# Patient Record
Sex: MALE | Race: WHITE | NOT HISPANIC OR LATINO | Employment: FULL TIME | URBAN - METROPOLITAN AREA
[De-identification: names, ages, dates, MRNs, and addresses within clinical notes are randomized per-mention and may not be internally consistent; named-entity substitution may affect disease eponyms.]

---

## 2023-10-02 ENCOUNTER — TREATMENT (OUTPATIENT)
Dept: PHYSICAL THERAPY | Facility: CLINIC | Age: 38
End: 2023-10-02
Payer: COMMERCIAL

## 2023-10-02 DIAGNOSIS — M54.50 BILATERAL LOW BACK PAIN: Primary | ICD-10-CM

## 2023-10-02 PROCEDURE — 97110 THERAPEUTIC EXERCISES: CPT | Mod: GP | Performed by: INTERNAL MEDICINE

## 2023-10-02 ASSESSMENT — PAIN - FUNCTIONAL ASSESSMENT: PAIN_FUNCTIONAL_ASSESSMENT: 0-10

## 2023-10-02 ASSESSMENT — PAIN SCALES - GENERAL: PAINLEVEL_OUTOF10: 4

## 2023-10-02 NOTE — PROGRESS NOTES
"Physical Therapy    Physical Therapy Treatment    Patient Name: Zach Cuellar  MRN: [id number]  Today's Date: 10/2/2023  Time Calculation  Start Time: 0801  Stop Time: 0839  Time Calculation (min): 38 min    Insurance reviewed   Visit number: 3/MN   Authorization not required after evaluation        Assessment:  PT Assessment  Evaluation/Treatment Tolerance: Patient tolerated treatment well  Focus of session on targeted R hip flexor stretching and progressing TA/core strengthening in sitting and standing. Cont to have positive response to pelvic MET. Also has resolution of R hip sx's/pain with hip flexor stretching off edge of mat; administered for HEP. Tolerates progression of seated and then standing TA/core strengthening without increase in sx's; also administered for HEP. Will monitor response to update NV. Reports no pain at end of session.    Plan:  Continue with current POC  Monitor HEP  Progress TA/core strengthening in sitting and standing as addie.    Current Problem  1. Bilateral low back pain            Subjective   HEP: 1-2x/day  Pain #: 4/10  Location: PSIS and R hip flexor/ASIS  Functional: Bought new supportive tennis shoes Wednesday night, got them Thursday and wore them through Saturday with </= 1/10. Got a deep tissue massage Friday night with relief. Played tennis Saturday with increase in R PSIS and R hip flexor pain.       Precautions  Precautions  Precautions Comment: None  Vital Signs     Pain  Pain Assessment: 0-10  Pain Score: 4  Pain Type: Acute pain  Pain Location: Hip (Back)  Pain Orientation: Right    Treatments:  Therapeutic Exercise: 38 mins   Access Code: [email or web address]  - SciFit lvl 4 x5 mins   - Pelvic MET with R hip isometric flexion and L hip isometric extension 10x10\" holds  - RLE modified gavin test stretch off edge of mat 3x30\" holds  - Seated TA bracing 10x5\" holds  - Seated TA hip abduction with GTB 2x10  - Seated TA march with GTB 5x; modified to without resistance due to increased R " "PSIS pain  - Standing rows with YTB  - Standing shld extension with YTB    NC:  - LTRs 10x5-10\" hold  - Supine TA 10x10\" holds  - Supine TA march 10x  - Supine TA hip abduction 10x  - Supine Hamstring Stretch with Strap - 2 x daily - 7 x weekly - 3-5 sets - 20-30 second hold  - Standing Hamstring Stretch with Step - 2 x daily - 7 x weekly - 3-5 sets - 20-30 second hold  - Supine Lower Trunk Rotation - 2 x daily - 7 x weekly - 2 sets - 10 reps - 10 second hold  - Prone Hip Extension on Table - 2 x daily - 7 x weekly - 2 sets - 10 reps.     KEY  NC = not completed this visit   ! = pain  ~ = approximation      OP EDUCATION:  Education  Individual(s) Educated: Patient  Education Provided: Anatomy, Home Exercise Program, Other (Developing B foot arch by walking around house without supportive shoes or slippers)  Patient Response to Education: Patient/Caregiver Verbalized Understanding of Information       "

## 2023-10-03 NOTE — PROGRESS NOTES
"Physical Therapy    Physical Therapy Treatment    Patient Name: Zach Cuellar  MRN: [id number]  Today's Date:  10/4/23  Time Calculation  Start Time: 0750  Stop Time: 0830  Time Calculation (min): 40 min  Insurance reviewed   Visit number: 4/MN   Authorization not required after evaluation       Assessment: Pt reported reduced pain, and feeling \"more level\" following manual therapy to address pelvic asymmetries noted.  R le presenting as longer leg.  Feel pt may be overutilizing R QL to clear R LE from ground while walking.  Pelvis symmetrical post MET, however, do feel pt has true Leg length discrepancy, R LE 1/2\" longer (1.3cm), recommending trial of heel lift in L shoe. Pt in agreement, as he has used in the past. Able to progress core ex in supine due to no longer challenging w/o incident.        Plan: monitor sxs to manual therapy, L heel lift. Progress core strength as able.   Suggestions: TB resisted sidestepping, zig zag walking;  paloff presses, fwd/side planks         Current Problem  1. Chronic bilateral low back pain with right-sided sciatica            Subjective   Pt reports relief w/ current tx, no pain w/ tennis playing x 2 hrs or walking.  Sitting at work seems to provoke pain the most.  Pt reports pain is anterior portion of R iliac crest.  LBP much improved since purchasing [employer] shoes.     Precautions: NONE  HeP: pt is compliant     Pain  Pain Score: 3  Pain Type: Acute pain  Pain Location: Hip  Pain Orientation: Right    Objective   Treatments:    Therapeutic Exercise: 8 mins, 1 unit   Access Code: [email or web address]  - SciFit lvl 4 x5 mins : subjective  - Pelvic MET with R hip isometric flexion and L hip isometric extension 10x10\" holds: PT TO PERFORM PRN ONLY  -bridge: CHANGED TO UNI ON 10/4 DUE TO NON CHALLENGING  - Seated TA march : CHANGED TO DEAD BUG AND SLR ON 10/4 DUE TO MARCH TOO EASY  Suggestions: TB resisted sidestepping, zig zag walking;  paloff presses, fwd/side planks    NC:  - LTRs 10x5-10\" " "hold  - Seated TA hip abduction with GTB 2x10  - Standing rows with YTB  - Standing shld extension with YTB   - RLE modified gavin test stretch off edge of mat 3x30\" holds  - Supine Hamstring Stretch with Strap - 2 x daily - 7 x weekly - 3-5 sets - 20-30 second hold  - Standing Hamstring Stretch with Step - 2 x daily - 7 x weekly - 3-5 sets - 20-30 second hold  - Supine Lower Trunk Rotation - 2 x daily - 7 x weekly - 2 sets - 10 reps - 10 second hold  - Prone Hip Extension on Table - 2 x daily - 7 x weekly - 2 sets - 10 reps.     MANUAL THERAPY 32 MIN, 2 UNITS  MET to correct R depressed pubis, R depressed IT.  Sacrum level.  STM post>> ant R iliac crest, R QL      OP EDUCATION: rationale for tx interventions.  HEP update.  Recommend 1/2\" heel lift L shoe.             "

## 2023-10-04 ENCOUNTER — TREATMENT (OUTPATIENT)
Dept: PHYSICAL THERAPY | Facility: CLINIC | Age: 38
End: 2023-10-04
Payer: COMMERCIAL

## 2023-10-04 DIAGNOSIS — G89.29 CHRONIC BILATERAL LOW BACK PAIN WITH RIGHT-SIDED SCIATICA: Primary | ICD-10-CM

## 2023-10-04 DIAGNOSIS — M54.41 CHRONIC BILATERAL LOW BACK PAIN WITH RIGHT-SIDED SCIATICA: Primary | ICD-10-CM

## 2023-10-04 PROCEDURE — 97140 MANUAL THERAPY 1/> REGIONS: CPT | Mod: GP,CQ

## 2023-10-04 PROCEDURE — 97110 THERAPEUTIC EXERCISES: CPT | Mod: GP,CQ

## 2023-10-04 ASSESSMENT — PAIN SCALES - GENERAL: PAINLEVEL_OUTOF10: 3

## 2023-10-08 PROBLEM — L73.9 FOLLICULAR DISORDER, UNSPECIFIED: Status: ACTIVE | Noted: 2022-12-01

## 2023-10-08 RX ORDER — MELOXICAM 15 MG/1
1 TABLET ORAL DAILY
COMMUNITY
Start: 2022-04-06

## 2023-10-08 RX ORDER — METHYLPREDNISOLONE 4 MG/1
TABLET ORAL
COMMUNITY
Start: 2023-07-06

## 2023-10-08 RX ORDER — NAPROXEN 500 MG/1
500 TABLET ORAL EVERY 12 HOURS PRN
COMMUNITY
Start: 2023-07-06

## 2023-10-08 RX ORDER — CLINDAMYCIN PHOSPHATE 11.9 MG/ML
1 SOLUTION TOPICAL
COMMUNITY
Start: 2018-01-05

## 2023-10-08 RX ORDER — LORATADINE 10 MG/1
10 TABLET ORAL
COMMUNITY

## 2023-10-09 ENCOUNTER — TREATMENT (OUTPATIENT)
Dept: PHYSICAL THERAPY | Facility: CLINIC | Age: 38
End: 2023-10-09
Payer: COMMERCIAL

## 2023-10-09 DIAGNOSIS — M54.41 CHRONIC BILATERAL LOW BACK PAIN WITH RIGHT-SIDED SCIATICA: Primary | ICD-10-CM

## 2023-10-09 DIAGNOSIS — G89.29 CHRONIC BILATERAL LOW BACK PAIN WITH RIGHT-SIDED SCIATICA: Primary | ICD-10-CM

## 2023-10-09 PROCEDURE — 97110 THERAPEUTIC EXERCISES: CPT | Mod: GP | Performed by: INTERNAL MEDICINE

## 2023-10-09 ASSESSMENT — PAIN SCALES - GENERAL: PAINLEVEL_OUTOF10: 0 - NO PAIN

## 2023-10-09 ASSESSMENT — PAIN - FUNCTIONAL ASSESSMENT: PAIN_FUNCTIONAL_ASSESSMENT: 0-10

## 2023-10-09 NOTE — PROGRESS NOTES
"Physical Therapy    Physical Therapy Treatment    Patient Name: Zach Cuellar  MRN: [id number]  Today's Date:  10/9/23  Time Calculation  Start Time: 0801  Stop Time: 0842  Time Calculation (min): 41 min    Insurance reviewed   Visit number: 5/MN   Authorization not required after evaluation       Assessment: Focus of session on progression of core and proximal hip strengthening. In to session with no back pain. Pt reports increased B hip flexor tension/activation but no pain reported throughout session. Appropriately challenged with plank progression with rest breaks taken to manage mm fatigue. Pt reports some soreness in L QL area at end of session after L anti-rotation exercise but no pain. Pt verbalizes understanding to updated HEP. Recommend decreasing PT frequency to 1x/week if pt returns next session with no pain.    PT Assessment  Evaluation/Treatment Tolerance: Patient tolerated treatment well    Plan: assess response to HEP update. Decrease PT frequency to 1x/week if pt returns next session with no pain.    Current Problem  1. Chronic bilateral low back pain with right-sided sciatica            Subjective   Endorses no pain at rest. Played tennis last night without increase in pain. Also able to lean fwd to grab his dog's food bowl without back pain. Tried 1/2\" heel lift in L shoe all day last Thursday but started to develop increased pain in R hip and so discontinued. Noticed increased B foot soreness Saturday; was on his feet more.     Precautions: NONE  HEP: pt is compliant    Pain  Pain Assessment: 0-10  Pain Score: 0 - No pain    Treatments:  Therapeutic Exercise: 41 mins   Access Code: [email or web address]  - SciFit lvl 6 x5 mins; LE warm-up and subjective  - Unilateral bridge 8x5\" holds each side  - Supine dead bug 10x3\" holds  - Green TB resisted side-stepping around ankles x6 laps  - Green TB resisted zig/zag/monster walks around ankles x6 laps  - FWD knee plank lifts 10x5\" holds  - Modified side plank lifts 8x5\" " "holds each side  - Paloff press with GTB 20x each side    NC:  - Pelvic MET with R hip isometric flexion and L hip isometric extension 10x10\" holds: PT TO PERFORM PRN ONLY: NC  - LTRs 10x5-10\" hold  - Seated TA hip abduction with GTB 2x10  - Standing rows with YTB  - Standing shld extension with YTB   - RLE modified gavin test stretch off edge of mat 3x30\" holds  - Supine Hamstring Stretch with Strap - 2 x daily - 7 x weekly - 3-5 sets - 20-30 second hold  - Standing Hamstring Stretch with Step - 2 x daily - 7 x weekly - 3-5 sets - 20-30 second hold  - Supine Lower Trunk Rotation - 2 x daily - 7 x weekly - 2 sets - 10 reps - 10 second hold  - Prone Hip Extension on Table - 2 x daily - 7 x weekly - 2 sets - 10 reps.     Manual Therapy:    NC:  MET to correct R depressed pubis, R depressed IT.  Sacrum level.  STM post>> ant R iliac crest, R QL    OP EDUCATION: rationale for tx interventions.  HEP update.  Recommend 1/2\" heel lift L shoe.   Education  Individual(s) Educated: Patient  Education Provided: Home Exercise Program (HEP updated)  Patient Response to Education: Patient/Caregiver Verbalized Understanding of Information, Patient/Caregiver Performed Return Demonstration of Exercises/Activities, Patient/Caregiver Asked Appropriate Questions  "

## 2023-10-10 NOTE — PROGRESS NOTES
"Physical Therapy    Physical Therapy Treatment    Patient Name: Zach Cuellar  MRN: [id number]  Today's Date:  10/11/23  Time Calculation  Start Time: 0730  Stop Time: 0813  Time Calculation (min): 43 min    Insurance reviewed   Visit number: 6/MN   Authorization not required after evaluation       Assessment: Focus of session on improving soft tissue mobility R QL: in supine, elevated R ASIS and iliac crest despite having true Leg length discrepancy w/ R LE longer. Due to c/c of pain in sit, ? How much leg length discrepancy affecting pain.  HEP updated w/ stretching ex.  Pt exited clinic w/ significantly less pain and easier to perform bridge.  Witheld core ex today due to time constraints and DOMS reported in subjective.         Plan: assess response to manual therapy/FDM.   Decrease PT frequency to 1x/week if pt returns next session with no pain.    Current Problem  1. Chronic bilateral low back pain with right-sided sciatica            Subjective   0/10 pain R ASIS but 3/10 pain w/ sitting/driving.  Pt enters clinic very sore post new ex last PT session and playing tennis last night.     Precautions: NONE  HEP: pt is compliant    Pain  Pain Assessment: 0-10  Pain Score: 0 - No pain  Pain Location: Hip  Pain Orientation: Right, Anterior    Treatments:  Therapeutic Exercise: 5 min , 0 units  VERBALLY ADDED TO HEP 10/11,Pt declined handouts:  -reviewed 3 way prayer stretching (20-30\"hold, 2-3x per position)  -hip flexor/quad stretching in stand, with and w/o trunk lean to opposite side     NP:  Access Code: [email or web address]  - SciFit lvl 6 x5 mins; LE warm-up and subjective  - Unilateral bridge 8x5\" holds each side  - Supine dead bug 10x3\" holds  - Green TB resisted side-stepping around ankles x6 laps  - Green TB resisted zig/zag/monster walks around ankles x6 laps  - FWD knee plank lifts 10x5\" holds  - Modified side plank lifts 8x5\" holds each side  - Paloff press with GTB 20x each side    NC:  - Pelvic MET with R hip " "isometric flexion and L hip isometric extension 10x10\" holds: PT TO PERFORM PRN ONLY: NC  - LTRs 10x5-10\" hold  - Seated TA hip abduction with GTB 2x10  - Standing rows with YTB  - Standing shld extension with YTB   - RLE modified gavin test stretch off edge of mat 3x30\" holds  - Supine Hamstring Stretch with Strap - 2 x daily - 7 x weekly - 3-5 sets - 20-30 second hold  - Standing Hamstring Stretch with Step - 2 x daily - 7 x weekly - 3-5 sets - 20-30 second hold  - Supine Lower Trunk Rotation - 2 x daily - 7 x weekly - 2 sets - 10 reps - 10 second hold  - Prone Hip Extension on Table - 2 x daily - 7 x weekly - 2 sets - 10 reps.     Manual Therapy:  38 min, 3 units  DTM, cupping w/ external glide, and FDM to several TrP  R QL and along R iliac crest     OP EDUCATION: rationale for tx interventions.  HEP updated verbally     "

## 2023-10-11 ENCOUNTER — TREATMENT (OUTPATIENT)
Dept: PHYSICAL THERAPY | Facility: CLINIC | Age: 38
End: 2023-10-11
Payer: COMMERCIAL

## 2023-10-11 DIAGNOSIS — G89.29 CHRONIC BILATERAL LOW BACK PAIN WITH RIGHT-SIDED SCIATICA: Primary | ICD-10-CM

## 2023-10-11 DIAGNOSIS — M54.41 CHRONIC BILATERAL LOW BACK PAIN WITH RIGHT-SIDED SCIATICA: Primary | ICD-10-CM

## 2023-10-11 PROCEDURE — 97140 MANUAL THERAPY 1/> REGIONS: CPT | Mod: GP,CQ

## 2023-10-11 ASSESSMENT — PAIN SCALES - GENERAL: PAINLEVEL_OUTOF10: 0 - NO PAIN

## 2023-10-11 ASSESSMENT — PAIN - FUNCTIONAL ASSESSMENT: PAIN_FUNCTIONAL_ASSESSMENT: 0-10

## 2023-10-12 NOTE — PROGRESS NOTES
"Physical Therapy    Physical Therapy Treatment    Patient Name: Zach Cuellar  MRN: [id number]  Today's Date:  10/11/23       Insurance reviewed   Visit number: 7/MN   Authorization not required after evaluation       Assessment: Focus of session on improving soft tissue mobility R QL: in supine, elevated R ASIS and iliac crest despite having true Leg length discrepancy w/ R LE longer. Due to c/c of pain in sit, ? How much leg length discrepancy affecting pain.  HEP updated w/ stretching ex.  Pt exited clinic w/ significantly less pain and easier to perform bridge.  Witheld core ex today due to time constraints and DOMS reported in subjective.         Plan: assess response to manual therapy/FDM.   Decrease PT frequency to 1x/week if pt returns next session with no pain.    Current Problem  1. Chronic bilateral low back pain with right-sided sciatica            Subjective   0/10 pain R ASIS but 3/10 pain w/ sitting/driving.  Pt enters clinic very sore post new ex last PT session and playing tennis last night.     Precautions: NONE  HEP: pt is compliant    Pain       Treatments:  Therapeutic Exercise: 5 min , 0 units  VERBALLY ADDED TO HEP 10/11,Pt declined handouts:  -reviewed 3 way prayer stretching (20-30\"hold, 2-3x per position)  -hip flexor/quad stretching in stand, with and w/o trunk lean to opposite side     NP:  Access Code: [email or web address]  - SciFit lvl 6 x5 mins; LE warm-up and subjective  - Unilateral bridge 8x5\" holds each side  - Supine dead bug 10x3\" holds  - Green TB resisted side-stepping around ankles x6 laps  - Green TB resisted zig/zag/monster walks around ankles x6 laps  - FWD knee plank lifts 10x5\" holds  - Modified side plank lifts 8x5\" holds each side  - Paloff press with GTB 20x each side    NC:  - Pelvic MET with R hip isometric flexion and L hip isometric extension 10x10\" holds: PT TO PERFORM PRN ONLY: NC  - LTRs 10x5-10\" hold  - Seated TA hip abduction with GTB 2x10  - Standing rows with YTB  - " "Standing shld extension with YTB   - RLE modified gavin test stretch off edge of mat 3x30\" holds  - Supine Hamstring Stretch with Strap - 2 x daily - 7 x weekly - 3-5 sets - 20-30 second hold  - Standing Hamstring Stretch with Step - 2 x daily - 7 x weekly - 3-5 sets - 20-30 second hold  - Supine Lower Trunk Rotation - 2 x daily - 7 x weekly - 2 sets - 10 reps - 10 second hold  - Prone Hip Extension on Table - 2 x daily - 7 x weekly - 2 sets - 10 reps.     Manual Therapy:  38 min, 3 units  DTM, cupping w/ external glide, and FDM to several TrP  R QL and along R iliac crest     OP EDUCATION: rationale for tx interventions.  HEP updated verbally     "

## 2023-10-16 ENCOUNTER — TREATMENT (OUTPATIENT)
Dept: PHYSICAL THERAPY | Facility: CLINIC | Age: 38
End: 2023-10-16
Payer: COMMERCIAL

## 2023-10-16 ENCOUNTER — DOCUMENTATION (OUTPATIENT)
Dept: PHYSICAL THERAPY | Facility: CLINIC | Age: 38
End: 2023-10-16
Payer: COMMERCIAL

## 2023-10-16 DIAGNOSIS — G89.29 CHRONIC BILATERAL LOW BACK PAIN WITH RIGHT-SIDED SCIATICA: Primary | ICD-10-CM

## 2023-10-16 DIAGNOSIS — M54.41 CHRONIC BILATERAL LOW BACK PAIN WITH RIGHT-SIDED SCIATICA: Primary | ICD-10-CM

## 2023-10-16 NOTE — PROGRESS NOTES
Physical Therapy                 Therapy Communication Note    Patient Name: Zach Cuellar  MRN: [id number]  Today's Date: 10/16/2023     Discipline: Physical Therapy    Missed Visit Reason:      Missed Time: Cancel    Comment:

## 2023-10-17 NOTE — PROGRESS NOTES
"Physical Therapy    Physical Therapy Treatment    Patient Name: Zach Cuellar  MRN: [id number]  Today's Date:  10/11/23  Time Calculation  Start Time: 0752  Stop Time: 0830  Time Calculation (min): 38 min    Insurance reviewed   Visit number: 7/MN   Authorization not required after evaluation       Assessment: + response to DTM and FDM to address several TrP R QL and iliac crest regions.  Significant improvement in soft tissue mobility noted post tx sessions, several TrP abolished. Pt pain free exiting clinic.  Applied trial of KT tape to inhibit region and instructed pt to continue w/ lucas pose stretching to improve carryover of pain relief from visit to visit.   Per discussion w/ Nuris Ram, PT, pt is not a DN candidate due to location of pain and clinician comfort level.     Plan: continue w/ DTM, FDM.  Decrease PT frequency to 1x/week if pt returns next session with no pain.    Current Problem  1. Chronic bilateral low back pain with right-sided sciatica            Subjective   Pt very pleased re: amount of pain relief experienced w/ soft tissue mobilization last session: cancelled last scheduled PT session due to he was pain free.  Able to play tennis pain free.  Driving/sitting reproduced pain couple days ago.  Pt requests manual therapy again this date.     Precautions: NONE  HEP: pt is compliant    Pain: see subjective       Treatments:  Therapeutic Exercise: 0 min , 0 units  VERBALLY ADDED TO HEP 10/11,Pt declined handouts:  -reviewed 3 way prayer stretching (20-30\"hold, 2-3x per position)       NP:  -hip flexor/quad stretching in stand, with and w/o trunk lean to opposite side  Access Code: [email or web address]  - SciFit lvl 6 x5 mins; LE warm-up and subjective  - Unilateral bridge 8x5\" holds each side  - Supine dead bug 10x3\" holds  - Green TB resisted side-stepping around ankles x6 laps  - Green TB resisted zig/zag/monster walks around ankles x6 laps  - FWD knee plank lifts 10x5\" holds  - Modified side plank lifts " "8x5\" holds each side  - Paloff press with GTB 20x each side  - Pelvic MET with R hip isometric flexion and L hip isometric extension 10x10\" holds: PT TO PERFORM PRN ONLY: NC  - LTRs 10x5-10\" hold  - Seated TA hip abduction with GTB 2x10  - Standing rows with YTB  - Standing shld extension with YTB   - RLE modified gavin test stretch off edge of mat 3x30\" holds  - Supine Hamstring Stretch with Strap - 2 x daily - 7 x weekly - 3-5 sets - 20-30 second hold  - Standing Hamstring Stretch with Step - 2 x daily - 7 x weekly - 3-5 sets - 20-30 second hold  - Supine Lower Trunk Rotation - 2 x daily - 7 x weekly - 2 sets - 10 reps - 10 second hold  - Prone Hip Extension on Table - 2 x daily - 7 x weekly - 2 sets - 10 reps.     Manual Therapy:  38 min, 3 units  DTM, IASTM (scanning, fanning)  and FDM to several TrP  R QL and along R iliac crest     OP EDUCATION: rationale for tx interventions.  HEP review.     "

## 2023-10-18 ENCOUNTER — TREATMENT (OUTPATIENT)
Dept: PHYSICAL THERAPY | Facility: CLINIC | Age: 38
End: 2023-10-18
Payer: COMMERCIAL

## 2023-10-18 DIAGNOSIS — M54.41 CHRONIC BILATERAL LOW BACK PAIN WITH RIGHT-SIDED SCIATICA: Primary | ICD-10-CM

## 2023-10-18 DIAGNOSIS — G89.29 CHRONIC BILATERAL LOW BACK PAIN WITH RIGHT-SIDED SCIATICA: Primary | ICD-10-CM

## 2023-10-18 PROCEDURE — 97140 MANUAL THERAPY 1/> REGIONS: CPT | Mod: GP,CQ

## 2023-10-23 ENCOUNTER — APPOINTMENT (OUTPATIENT)
Dept: PHYSICAL THERAPY | Facility: CLINIC | Age: 38
End: 2023-10-23
Payer: COMMERCIAL

## 2023-10-24 NOTE — PROGRESS NOTES
Physical Therapy    Physical Therapy Treatment    Patient Name: Zach Cuellar  MRN: [id number]  Today's Date:  10/11/23       Insurance reviewed   Visit number: 8/MN   Authorization not required after evaluation       Assessment: + response to DTM and FDM to address several TrP R QL and iliac crest regions.  Significant improvement in soft tissue mobility noted post tx sessions, several TrP abolished. Pt pain free exiting clinic.  Applied trial of KT tape to inhibit region and instructed pt to continue w/ lucas pose stretching to improve carryover of pain relief from visit to visit.   Per discussion w/ Nuris Ram, PT, pt is not a DN candidate due to location of pain and clinician comfort level.     Plan: continue w/ DTM, FDM.  Decrease PT frequency to 1x/week if pt returns next session with no pain.    Current Problem  1. Chronic bilateral low back pain with right-sided sciatica              Subjective   Pt very pleased re: amount of pain relief experienced w/ soft tissue mobilization last session: cancelled last scheduled PT session due to he was pain free.  Able to play tennis pain free.  Driving/sitting reproduced pain couple days ago.  Pt requests manual therapy again this date.     Precautions: NONE  HEP: pt is compliant    Pain: see subjective       Objective:  MODQ: 19% -->    Observation: L knee scars; pt reports having growth plate scraped on L knee when he was 15 years old. L knee varus noted in standing. Pt reports no pain or sx's with prone elbow prop during palpation -->  Gait: WFL -->  Posture: fair, forward head, rounded shoulders in sitting; improved in standing. L knee varus noted in standing -->  Correction of posture: sx's better -->    Dermatomes/Sensation Testing:  (L2) Anterior Thigh: intact -->  (L3) Medial Knee: intact -->  (L4) Medial Malleolus: intact -->  (L5) Dorsal Second Toe Web Space: intact -->  (S1) Lateral Malleolus: intact -->    Myotomes/Strength Testing (MMT in sitting):  (L2)  "Hip Flex (R/L): 4+/4+ -->  (L3) Knee Ext (R/L): 5/5 -->  Knee Flex (R/L): 5/5 -->  (L4) Ankle DF (R/L): 5/5 -->  (L5) GTE (R/L): 5/5 -->  (S1) Ankle PF (R/L): 5/5 -->    Hip Abd (R/L): 4+/4+ -->  Hip Ext (R/L): 4- / 4 -->    HS Flexibility (R/L): ~ -40 / ~ -40 -->    Heel walking (L4): strong -->  Toe walking (S1): strong -->    Special Tests:  Slump (R/L): neg / neg  -->  Active SLR (R/L): neg / neg  -->    Lumbar ROM/Pretest Symptoms Standing:  FIS: mild to moderate limitation with reach to mid shins  -->  RFIS: 10x; sx's worse (R PSIS and piriformis) -->  EIS: no limitation; sx's same -->  ANDREW: 10x; sx's worse -->    Trunk SB (R/L): no limitation p! / no limitation p!  -->  Trunk Rotation (R/L): no limitation / no limitation p! -->    Pretest Symptoms Lying:  ZI: SKTC; ne on sx's -->  RFIL: 10x SKTC; ne on sx's -->  Prone lying: NT -->  Prone on elbows: NT-->  EIL (PPU): NT -->  REIL: NT-->    Palpation: no tenderness currently. Reports he will get tenderness in R PSIS and piriformis area -->      KEY  NT = not tested this visit  p! = pain  ~ = approximation.     Treatments:  Therapeutic Exercise: 0 min , 0 units  VERBALLY ADDED TO HEP 10/11,Pt declined handouts:  -reviewed 3 way prayer stretching (20-30\"hold, 2-3x per position)     NP:  -hip flexor/quad stretching in stand, with and w/o trunk lean to opposite side  Access Code: [email or web address]  - SciFit lvl 6 x5 mins; LE warm-up and subjective  - Unilateral bridge 8x5\" holds each side  - Supine dead bug 10x3\" holds  - Green TB resisted side-stepping around ankles x6 laps  - Green TB resisted zig/zag/monster walks around ankles x6 laps  - FWD knee plank lifts 10x5\" holds  - Modified side plank lifts 8x5\" holds each side  - Paloff press with GTB 20x each side  - Pelvic MET with R hip isometric flexion and L hip isometric extension 10x10\" holds: PT TO PERFORM PRN ONLY: NC  - LTRs 10x5-10\" hold  - Seated TA hip abduction with GTB 2x10  - Standing rows with YTB  - " "Standing shld extension with YTB   - RLE modified gavin test stretch off edge of mat 3x30\" holds  - Supine Hamstring Stretch with Strap - 2 x daily - 7 x weekly - 3-5 sets - 20-30 second hold  - Standing Hamstring Stretch with Step - 2 x daily - 7 x weekly - 3-5 sets - 20-30 second hold  - Supine Lower Trunk Rotation - 2 x daily - 7 x weekly - 2 sets - 10 reps - 10 second hold  - Prone Hip Extension on Table - 2 x daily - 7 x weekly - 2 sets - 10 reps.     Manual Therapy:  38 min, 3 units  DTM, IASTM (scanning, fanning)  and FDM to several TrP  R QL and along R iliac crest     OP EDUCATION: rationale for tx interventions.  HEP review.       Goals:  ST weeks  1. Patient independent with home exercise program to progress current functional status    LTGs: 4-6 weeks  1. Improve Oswestry score to 0-9% impairment to indicate a significant improvement in overall lumbar perception of disability.  2. Decrease complaints of activity pain by 80% at minimum of evaluation rating, 4 of 7 days a week at minimum.  3. Patient will demonstrate improvements in sitting and standing posture without cueing from therapist during 45 minute session to allow for improved tolerances for spinal loading.  4. Patient will demonstrate appropriate and safe body mechanics with work related and/or clinical activities to manage pain and prevent further injury  5. Increase hip extensor strength by 0.5 to 1 manual testing grade to improve overall functional mobility for IADLS and postural control.   6. Patient will be able to play full game of tennis or pickleball with </= 2/10 LBP to improve tolerance to recreational activities and return to PLOF   "

## 2023-10-25 ENCOUNTER — TREATMENT (OUTPATIENT)
Dept: PHYSICAL THERAPY | Facility: CLINIC | Age: 38
End: 2023-10-25
Payer: COMMERCIAL

## 2023-10-25 DIAGNOSIS — G89.29 CHRONIC BILATERAL LOW BACK PAIN WITH RIGHT-SIDED SCIATICA: Primary | ICD-10-CM

## 2023-10-25 DIAGNOSIS — M54.41 CHRONIC BILATERAL LOW BACK PAIN WITH RIGHT-SIDED SCIATICA: Primary | ICD-10-CM

## 2023-10-31 ENCOUNTER — ANCILLARY PROCEDURE (OUTPATIENT)
Dept: RADIOLOGY | Facility: CLINIC | Age: 38
End: 2023-10-31
Payer: COMMERCIAL

## 2023-10-31 ENCOUNTER — OFFICE VISIT (OUTPATIENT)
Dept: ORTHOPEDIC SURGERY | Facility: CLINIC | Age: 38
End: 2023-10-31
Payer: COMMERCIAL

## 2023-10-31 VITALS — WEIGHT: 265 LBS | BODY MASS INDEX: 35.89 KG/M2 | HEIGHT: 72 IN

## 2023-10-31 DIAGNOSIS — M25.551 RIGHT HIP PAIN: Primary | ICD-10-CM

## 2023-10-31 DIAGNOSIS — M25.551 RIGHT HIP PAIN: ICD-10-CM

## 2023-10-31 PROCEDURE — 73502 X-RAY EXAM HIP UNI 2-3 VIEWS: CPT | Mod: RT,FY

## 2023-10-31 PROCEDURE — 73502 X-RAY EXAM HIP UNI 2-3 VIEWS: CPT | Mod: RIGHT SIDE | Performed by: STUDENT IN AN ORGANIZED HEALTH CARE EDUCATION/TRAINING PROGRAM

## 2023-10-31 PROCEDURE — 99214 OFFICE O/P EST MOD 30 MIN: CPT

## 2023-10-31 NOTE — PROGRESS NOTES
Subjective    Patient ID: Zach Cuellar is a 37 y.o. male.    Chief Complaint: Pain of the Right Hip    HPI  This is a pleasant 37-year-old male following up in the office for evaluation of right superior hip and pelvis pain, which has been ongoing for the last few weeks.  No known injury.  I had seen patient in my office in July 2023 for evaluation of lower back and right hip pain.  Patient was more symptomatic with his back, therefore I ordered him a Medrol Dosepak and naproxen, and referred him to physical therapy.  He states that Medrol Dosepak and naproxen were of no relief, but he did perform physical therapy for his low back and hip for 6 to 8 weeks, which did help his low back pain.  States that physical therapist also did deep massage and use Kinesiotape.  He also has a friend who is a pain management doctor, who suggested a right shoe lift.  Patient states that right shoe lift aggravated right hip pain, therefore he discontinued.  He has also switched to more supportive tennis shoes, Eckert.  Unfortunately the right hip pain continued.  He points to right superior hip when describing the pain.  Pain is described as a daily dull ache.  Pain is worse when he is in a sitting position or driving in the car.  It is difficult for him to lay on his right side due to the pain.  He denies right groin thigh or buttock pain.    The patient's past medical, surgical, family, and social history as well as allergies and medications were reviewed and updated in the chart.    Objective   Ortho Exam  Pleasant and no acute distress. Walks with a mildly antalgic gait. Normal right hip appearance without overlying skin changes, ecchymosis, swelling.  He is tender upon palpation of superior iliac crest and hip.  Patient can arise off the chair and ambulate in the office today weightbearing as tolerated without a limp or use of an assistive device. Satisfaction motion of the hip without groin pain elicited. The right hip has  flexion to 100 degrees, with excellent internal and external rotation.  Negative KAREEM and FADIR. Slight increased discomfort with right straight leg raise. No effusion, instability, pain, or crepitus. Both lower extremeties are well perfused, skin is intact, and muscle tone is adequate. Sensation intact to light touch.    Image Results:  XR lumbar spine 2-3 views  Narrative: Interpreted By:  FEDERICO FRANCO MD  MRN: [id number]  Patient Name: FARHAT HEADLEY     STUDY:  Lumbar spine dated  7/6/2023.     INDICATION:  back pain  M54.50: Low back pain     COMPARISON:  None.     ACCESSION NUMBER(S):  [id number]     ORDERING CLINICIAN:  BHUMIKA DUARTE     TECHNIQUE:  AP, lateral, and L5-S1 radiographs of the lumbar spine.     FINDINGS:  There are  5 lumbar type vertebral bodies.  Vertebral body height and  alignment  are maintained.  No fracture or dislocation is evident.  There is likely at least mild facet degenerative change at L4-5 and  L5-S1. Vascular calcifications are seen over the soft tissues.     Impression: Likely mild lower lumbar spine facet degenerative change as seen on  radiography.    Multiple view x-rays of the right hip obtained today personally reviewed per myself and Dr. Scott.  There is no evidence of acute fracture or dislocation.  The hip joint is well-maintained.    Assessment/Plan   Encounter Diagnoses: Atraumatic right hip pain, failure of conservative treatment    Plan: Discussion with patient about differential diagnoses with review of right hip and previous lumbar spine x-rays.  Patient has failed conservative treatment of a Medrol Dosepak, naproxen, ice and heat therapy, 8 weeks of formal physical therapy from September through October for lower back and right hip, a right shoe lift, deep tissue massage and kinesiotaping.  With the failure of this conservative treatment, an MRI of the right hip should be contacted to assess for internal derangement of the right hip, tendon or muscle damage.  He  will continue to avoid aggravating activities, anti-inflammatories, and home exercise program from recent completion of PT.   Once MRI results are complete, I will contact patient for further treatment options.

## 2023-11-09 ENCOUNTER — ANCILLARY PROCEDURE (OUTPATIENT)
Dept: RADIOLOGY | Facility: CLINIC | Age: 38
End: 2023-11-09
Payer: COMMERCIAL

## 2023-11-09 DIAGNOSIS — M25.551 RIGHT HIP PAIN: ICD-10-CM

## 2023-11-09 PROCEDURE — 73721 MRI JNT OF LWR EXTRE W/O DYE: CPT | Mod: RIGHT SIDE | Performed by: RADIOLOGY

## 2023-11-09 PROCEDURE — 72195 MRI PELVIS W/O DYE: CPT | Mod: RIGHT SIDE | Performed by: RADIOLOGY

## 2023-11-09 PROCEDURE — 73721 MRI JNT OF LWR EXTRE W/O DYE: CPT | Mod: RT

## 2023-11-10 ENCOUNTER — TELEPHONE (OUTPATIENT)
Dept: ORTHOPEDIC SURGERY | Facility: CLINIC | Age: 38
End: 2023-11-10
Payer: COMMERCIAL

## 2023-11-10 NOTE — TELEPHONE ENCOUNTER
Spoke with patient in regards to normal right hip MRI. He will follow up with Dr. [place] Dia - pain management [facility].

## 2023-11-13 ENCOUNTER — APPOINTMENT (OUTPATIENT)
Dept: PHYSICAL THERAPY | Facility: CLINIC | Age: 38
End: 2023-11-13
Payer: COMMERCIAL

## 2024-06-07 ENCOUNTER — OFFICE VISIT (OUTPATIENT)
Dept: ORTHOPEDIC SURGERY | Facility: CLINIC | Age: 39
End: 2024-06-07
Payer: COMMERCIAL

## 2024-06-07 ENCOUNTER — APPOINTMENT (OUTPATIENT)
Dept: ORTHOPEDIC SURGERY | Facility: CLINIC | Age: 39
End: 2024-06-07
Payer: COMMERCIAL

## 2024-06-07 VITALS — HEIGHT: 75 IN | BODY MASS INDEX: 30.84 KG/M2 | WEIGHT: 248 LBS

## 2024-06-07 DIAGNOSIS — M25.532 BILATERAL WRIST PAIN: ICD-10-CM

## 2024-06-07 DIAGNOSIS — G56.03 BILATERAL CARPAL TUNNEL SYNDROME: Primary | ICD-10-CM

## 2024-06-07 DIAGNOSIS — M25.531 BILATERAL WRIST PAIN: ICD-10-CM

## 2024-06-07 PROCEDURE — 99213 OFFICE O/P EST LOW 20 MIN: CPT

## 2024-06-07 PROCEDURE — 1036F TOBACCO NON-USER: CPT

## 2024-06-07 NOTE — PROGRESS NOTES
Subjective    Patient ID: Zach Cuellar is a 38 y.o. male.    Chief Complaint: Pain of the Right Hand (Pain and numbness and tingling right hand and wrist) and Pain of the Left Hand (Pain and numbness and tingling left hand and wrist)     HPI  This is a pleasant 38-year-old male presenting to the office for evaluation of bilateral wrist pain, as well as numbness and paresthesia.  States that the pain is pretty consistent, but numbness and paresthesia is intermittent.  Numbness and paresthesia is not in any specific fingers, feels consistent throughout his whole hand.  Numbness and paresthesia and pain is worse at night and with driving.  He is having difficulty with any twisting motion of the left hand and wrist, including opening a jar, or turning a key.  There has been no known injury.  He works in IT, mostly sedentary work at a desk.  He does like to remain active, playing tennis.    The patient's past medical, surgical, family, and social history as well as allergies and medications were reviewed and updated in the chart.    Objective   Ortho Exam  Patient is in no acute distress.  Exam of left and right upper extremity reveals pain-free motion at elbow.  He is tender upon palpation of ulnar side of each wrist.  No erythema ecchymosis soft tissue swelling.  There is no warmth upon touch.  No evidence of thenar atrophy or intrinsic atrophy.  Positive Tinel's test bilaterally, positive carpal tunnel compression test bilaterally, and a positive Phalen test bilaterally.     Assessment/Plan   Encounter Diagnoses: Bilateral wrist pain, bilateral carpal tunnel syndrome    Plan: Discussion with patient about diagnosis with review of conservative treatment options.  Explained to patient that he should obtain carpal tunnel braces, specifically to wear at night, but can wear during the day as tolerated.  We also discussed carpal tunnel injections of Kenalog/lidocaine into each wrist to help decrease pain inflammation.  We  also discussed getting further testing with an EMG of bilateral upper extremities to assess for median neuropathy.  Patient is aware that eventually surgical intervention could be indicated if conservative treatment options are not of benefit.  After discussion of all treatment options, patient and I have decided to proceed with carpal tunnel braces.  He is aware he can return to the office if he would like to try injections.  I can also order him an EMG if he would like.  He will continue to monitor symptoms and follow-up as symptoms dictate.

## 2024-10-16 ENCOUNTER — HOSPITAL ENCOUNTER (OUTPATIENT)
Dept: RADIOLOGY | Facility: CLINIC | Age: 39
Discharge: HOME | End: 2024-10-16
Payer: COMMERCIAL

## 2024-10-16 ENCOUNTER — OFFICE VISIT (OUTPATIENT)
Dept: ORTHOPEDIC SURGERY | Facility: CLINIC | Age: 39
End: 2024-10-16
Payer: COMMERCIAL

## 2024-10-16 DIAGNOSIS — M25.572 LEFT ANKLE PAIN, UNSPECIFIED CHRONICITY: ICD-10-CM

## 2024-10-16 DIAGNOSIS — M79.672 LEFT FOOT PAIN: ICD-10-CM

## 2024-10-16 DIAGNOSIS — M79.662 PAIN OF LEFT CALF: Primary | ICD-10-CM

## 2024-10-16 DIAGNOSIS — R60.0 LEG EDEMA, LEFT: ICD-10-CM

## 2024-10-16 DIAGNOSIS — M67.88 ACHILLES TENDINOSIS: ICD-10-CM

## 2024-10-16 PROCEDURE — 99214 OFFICE O/P EST MOD 30 MIN: CPT | Performed by: PHYSICIAN ASSISTANT

## 2024-10-16 PROCEDURE — 73630 X-RAY EXAM OF FOOT: CPT | Mod: LEFT SIDE | Performed by: RADIOLOGY

## 2024-10-16 PROCEDURE — 73610 X-RAY EXAM OF ANKLE: CPT | Mod: LEFT SIDE | Performed by: RADIOLOGY

## 2024-10-16 PROCEDURE — 99204 OFFICE O/P NEW MOD 45 MIN: CPT | Performed by: PHYSICIAN ASSISTANT

## 2024-10-16 PROCEDURE — 73630 X-RAY EXAM OF FOOT: CPT | Mod: LT

## 2024-10-16 PROCEDURE — 73610 X-RAY EXAM OF ANKLE: CPT | Mod: LT

## 2024-10-16 NOTE — PROGRESS NOTES
NPV-   History of Present Illness  38 y.o.male left leg pain  1. Pain of left calf  Referral to Vascular Medicine    CANCELED: Lower extremity venous duplex left      2. Left foot pain  XR foot left 3+ views      3. Left ankle pain, unspecified chronicity  XR ankle left 3+ views      4. Achilles tendinosis        5. Leg edema, left  Referral to Vascular Medicine    CANCELED: Lower extremity venous duplex left        Mechanism of injury: none  Date of Injury/Pain: 3 weeks  Location of pain: calf pain  Frequency of Pain: worse with rest, sitting  Associated symptoms? Swelling.  Previous treatment?  None  He has still been playing tennis and has no pain with playing or walking   He denies any hx of DVT, No sob, no family hx of DVT    No past medical history on file.     No Known Allergies     No past surgical history on file.     No family history on file.     Social History     Socioeconomic History    Marital status: Single     Spouse name: Not on file    Number of children: Not on file    Years of education: Not on file    Highest education level: Not on file   Occupational History    Not on file   Tobacco Use    Smoking status: Never    Smokeless tobacco: Never   Substance and Sexual Activity    Alcohol use: Yes     Comment: moderate    Drug use: Never    Sexual activity: Not on file   Other Topics Concern    Not on file   Social History Narrative    Not on file     Social Drivers of Health     Financial Resource Strain: Not on file   Food Insecurity: Not on file   Transportation Needs: Not on file   Physical Activity: Not on file   Stress: Not on file   Social Connections: Not on file   Intimate Partner Violence: Not on file   Housing Stability: Not on file        CURRENT MEDICATIONS:   Current Outpatient Medications   Medication Sig Dispense Refill    clindamycin (Cleocin T) 1 % external solution 1 Application.      loratadine (Claritin) 10 mg tablet Take 1 tablet (10 mg) by mouth once daily.      meloxicam  (Mobic) 15 mg tablet Take 1 tablet (15 mg) by mouth once daily.      methylPREDNISolone (Medrol Dospak) 4 mg tablets TAKE AS DIRECTED PER PACKAGE INSTRUCTIONS      naproxen (Naprosyn) 500 mg tablet Take 1 tablet (500 mg) by mouth every 12 hours if needed.       No current facility-administered medications for this visit.        27 point review of systems negative except what is stated in HPI    Constitutional Exam: patient's height and weight reviewed, well-kempt  Psychiatric Exam: alert and oriented x 3, appropriate mood and behavior  Eye Exam: TONEY BARRAGAN  Pulmonary Exam: breathing non-labored, no apparent distress  Lymphatic exam: no appreciable lymphadenopathy in the lower extremities  Cardiovascular exam: DP pulses 2+ bilaterally, PT 2+ bilaterally, toes are pink with good capillary refill, no pitting edema  Skin exam: no open lesions, rashes, abrasions or ulcerations  Neurological exam: sensation to light touch intact in both lower extremities in peripheral and dermatomal distributions (except for any abnormalities noted in musculoskeletal exam)      On examination of the left ankle/foot:  Normal gait  Normal alignment  Mild swelling of the leg. No ecchymosis or erythema  Normal ROM in plantarflexion, dorsiflexion, inversion and eversion  Normal strength in plantarflexion, dorsiflexion, inversion and eversion.  Tenderness to palpation: calf  No tenderness to palpation over the Achilles, medial malleolus, posterior tibial tendon, peroneal tendon, base of fifth metatarsal, deltoid ligament, talus or navicular.  Neurovascularly intact.  No sensory deficit to light touch.  Dorsalis pedis and posterior tibial pulses 2+ bilaterally.  Negative proprioception testing.   - Granados's test                              - Dorsiflexion-eversion test  - Anterior Drawer test                        - Talar tilt test  - Squeeze test   + Dawson's testing.   IMAGING  I personally reviewed the patient's x-ray images and reports of  the left foot and ankle. The xrays show no fractures or dislocation.  Normal joint spacing         ASSESSMENT: left leg edema, calf pain, Achilles tendinosis     PLAN: Treatment options were discussed with the patient. It was recommended he go to the ER to be worked up for a possible DVT. He understood and will head to the ER today.  The patient was given a prescription for physical therapy.  Physical therapy is medically necessary to improve strength, balance, range of motion and functional outcomes after injury and/or surgery. Patient was given a handout and instructed on an at home stretching program.  They should do these exercises 3 times per day for 6 weeks and then daily. Patient can use OTC aspercream for pain and continue to ice and elevate supported at the calf to reduce swelling. All the patient's questions were answered. The patient agrees with the above plan.  Follow up at ER    Delio Perez PA-C

## 2024-10-16 NOTE — PATIENT INSTRUCTIONS
You were ordered an ultrasound to rule out a blood clot; please go to [facility] to get your testing done today    1. Follow stretching exercises that were on a separate handout   2. Hold each stretch for a least 1 minute  3. Do not bounce while stretching  4. Stretch for 10 minutes at a time, 3x a day for 6 weeks then daily  5. Remember, it takes several weeks to a few months of consistent stretching to increase flexibility and decrease symptoms.     You can use OTC Voltaren gel or aspercream and apply it to the injured area.    Ice and elevate supported at the calf with no pressure on the heel to reduce swelling.    Wear compression stockings to reduce swelling     If you received a plantar fascial night splint, then it should be worn for 6 weeks nightly and as needed after that period of time.  This is to minimize your stiffness and pain with the first few steps in the morning.    Referral to vascular

## 2024-10-17 ENCOUNTER — APPOINTMENT (OUTPATIENT)
Dept: RADIOLOGY | Facility: HOSPITAL | Age: 39
End: 2024-10-17
Payer: COMMERCIAL

## 2024-10-17 ENCOUNTER — APPOINTMENT (OUTPATIENT)
Dept: CARDIOLOGY | Facility: HOSPITAL | Age: 39
End: 2024-10-17
Payer: COMMERCIAL

## 2024-10-17 ENCOUNTER — HOSPITAL ENCOUNTER (EMERGENCY)
Facility: HOSPITAL | Age: 39
Discharge: HOME | End: 2024-10-17
Attending: EMERGENCY MEDICINE
Payer: COMMERCIAL

## 2024-10-17 VITALS
DIASTOLIC BLOOD PRESSURE: 86 MMHG | OXYGEN SATURATION: 99 % | HEART RATE: 95 BPM | WEIGHT: 240 LBS | SYSTOLIC BLOOD PRESSURE: 138 MMHG | HEIGHT: 75 IN | BODY MASS INDEX: 29.84 KG/M2 | RESPIRATION RATE: 18 BRPM | TEMPERATURE: 97.2 F

## 2024-10-17 DIAGNOSIS — M79.605 LEFT LEG PAIN: Primary | ICD-10-CM

## 2024-10-17 DIAGNOSIS — M79.89 OTHER SPECIFIED SOFT TISSUE DISORDERS: ICD-10-CM

## 2024-10-17 PROCEDURE — 93971 EXTREMITY STUDY: CPT | Performed by: SURGERY

## 2024-10-17 PROCEDURE — 93971 EXTREMITY STUDY: CPT

## 2024-10-17 PROCEDURE — 73590 X-RAY EXAM OF LOWER LEG: CPT | Mod: LEFT SIDE | Performed by: RADIOLOGY

## 2024-10-17 PROCEDURE — 99284 EMERGENCY DEPT VISIT MOD MDM: CPT | Mod: 25

## 2024-10-17 PROCEDURE — 99284 EMERGENCY DEPT VISIT MOD MDM: CPT | Performed by: EMERGENCY MEDICINE

## 2024-10-17 PROCEDURE — 73590 X-RAY EXAM OF LOWER LEG: CPT | Mod: LT

## 2024-10-17 ASSESSMENT — LIFESTYLE VARIABLES
EVER HAD A DRINK FIRST THING IN THE MORNING TO STEADY YOUR NERVES TO GET RID OF A HANGOVER: NO
TOTAL SCORE: 0
EVER FELT BAD OR GUILTY ABOUT YOUR DRINKING: NO
HAVE YOU EVER FELT YOU SHOULD CUT DOWN ON YOUR DRINKING: NO
HAVE PEOPLE ANNOYED YOU BY CRITICIZING YOUR DRINKING: NO

## 2024-10-17 ASSESSMENT — PAIN DESCRIPTION - LOCATION: LOCATION: LEG

## 2024-10-17 ASSESSMENT — PAIN SCALES - GENERAL: PAINLEVEL_OUTOF10: 7

## 2024-10-17 ASSESSMENT — COLUMBIA-SUICIDE SEVERITY RATING SCALE - C-SSRS
2. HAVE YOU ACTUALLY HAD ANY THOUGHTS OF KILLING YOURSELF?: NO
6. HAVE YOU EVER DONE ANYTHING, STARTED TO DO ANYTHING, OR PREPARED TO DO ANYTHING TO END YOUR LIFE?: NO
1. IN THE PAST MONTH, HAVE YOU WISHED YOU WERE DEAD OR WISHED YOU COULD GO TO SLEEP AND NOT WAKE UP?: NO

## 2024-10-17 ASSESSMENT — PAIN DESCRIPTION - PAIN TYPE: TYPE: ACUTE PAIN

## 2024-10-17 ASSESSMENT — PAIN - FUNCTIONAL ASSESSMENT: PAIN_FUNCTIONAL_ASSESSMENT: 0-10

## 2024-10-17 NOTE — DISCHARGE INSTRUCTIONS
Please follow-up with your newly assigned primary care doctor and orthopedic surgery team.  Return immediately to the emergency department if you develop any worsening left leg pain, change in color such as pallor, bluish discoloration, coldness, worsening swelling, inability to bear weight, or if concerns arise.  Take Tylenol/Motrin for pain control.  Increase oral fluid intake.  You may need a repeat ultrasound to the left lower extremity in 7 to 10 days if your symptoms persist because some clots does not show up on the initial ultrasound.

## 2024-10-17 NOTE — ED PROVIDER NOTES
HPI   Chief Complaint   Patient presents with    Leg Pain     Pt presents to the ED with left calf swelling and pain x 4 days - pt followed up with ortho yesterday and they instructed pt to follow up in the ED to rule out blood clot. Left calf is swollen, left calf pain, but area is not warm to the touch.       This is a 38 years old male patient presented to the emergency department with a chief complaint of left leg pain.  Stated that he was seen by orthopedic surgery team and had x-ray done to the foot and ankle on the left side that was unremarkable.  Stated that 2 weeks ago he started to play tennis and that is somewhat unusual activity for.  Denies any change in skin color such as pallor, cyanosis, denies numbness, weakness, stated that when he flexes left knee and extend he feels pain to the lateral aspect of the left leg.  Denies any chest pain, shortness of breath, cough, fever, chills, body aches, weakness, or focal numbness.    Review of system: As above in HPI section.            Patient History   History reviewed. No pertinent past medical history.  History reviewed. No pertinent surgical history.  No family history on file.  Social History     Tobacco Use    Smoking status: Never    Smokeless tobacco: Never   Substance Use Topics    Alcohol use: Yes     Comment: moderate    Drug use: Never       Physical Exam   ED Triage Vitals [10/17/24 1624]   Temperature Heart Rate Respirations BP   36.2 °C (97.2 °F) 95 18 138/86      Pulse Ox Temp Source Heart Rate Source Patient Position   99 % Temporal Monitor Sitting      BP Location FiO2 (%)     Right arm --       Physical Exam  Vitals and nursing note reviewed.   Constitutional:       General: He is not in acute distress.     Appearance: He is well-developed.   HENT:      Head: Normocephalic and atraumatic.   Eyes:      Conjunctiva/sclera: Conjunctivae normal.   Cardiovascular:      Rate and Rhythm: Normal rate and regular rhythm.      Heart sounds: No murmur  heard.  Pulmonary:      Effort: Pulmonary effort is normal. No respiratory distress.      Breath sounds: Normal breath sounds.   Abdominal:      Palpations: Abdomen is soft.      Tenderness: There is no abdominal tenderness.   Musculoskeletal:         General: No swelling. Normal range of motion.      Cervical back: Neck supple.      Comments: Left lower extremity/left leg appears bigger in size in comparison to the right.  Intact neurovascular exam, intact dorsalis pedis pulse, intact cap refill less than 2 seconds.  No pain with passive extension of the big toe and the foot.  No tense compartment appreciated on palpation.  Negative Homans' sign.  No change in skin color/signs of cellulitis.  Patient is able to bear weight and walk steadily with no pain.   Skin:     General: Skin is warm and dry.      Capillary Refill: Capillary refill takes less than 2 seconds.   Neurological:      Mental Status: He is alert.   Psychiatric:         Mood and Affect: Mood normal.       ED Course & MDM   Diagnoses as of 10/17/24 1902   Left leg pain                 No data recorded     Griffin Coma Scale Score: 15 (10/17/24 1632 : Aria Jain RN)                           Medical Decision Making  Patient seen and examined, obtained ultrasound to the left lower extremity that was unremarkable for DVT.  Will obtain x-ray to the left tib-fib.  If all unremarkable we will be able to discharge the patient home to follow-up with the newly assigned primary care doctor as well as orthopedic surgery team, likely musculoskeletal in nature, likely precipitated by recently started activity/playing tennis.  Recommended administering Motrin/Tylenol for pain control, return to the emergency department if alarming symptoms arise as per discharge instruction.    X-ray reveals no acute osseous abnormalities.  Dispo as per the above plan.      I saw and evaluated the patient. I personally obtained the key and critical portions of the history  and physical exam or was physically present for key and critical portions performed by the resident/fellow. I reviewed the resident/fellow's documentation and discussed the patient with the resident/fellow. I agree with the resident/fellow's medical decision making as documented in the note.    Jett Gerardo DO         Procedure  Procedures     Jett Gerardo,   10/17/24 1906

## 2025-01-02 ENCOUNTER — APPOINTMENT (OUTPATIENT)
Dept: CARDIOLOGY | Facility: CLINIC | Age: 40
End: 2025-01-02
Payer: COMMERCIAL

## 2025-01-13 ENCOUNTER — APPOINTMENT (OUTPATIENT)
Dept: DERMATOLOGY | Facility: CLINIC | Age: 40
End: 2025-01-13
Payer: COMMERCIAL

## 2025-02-19 DIAGNOSIS — G89.29 CHRONIC LOW BACK PAIN, UNSPECIFIED BACK PAIN LATERALITY, UNSPECIFIED WHETHER SCIATICA PRESENT: Primary | ICD-10-CM

## 2025-02-19 DIAGNOSIS — M54.50 CHRONIC LOW BACK PAIN, UNSPECIFIED BACK PAIN LATERALITY, UNSPECIFIED WHETHER SCIATICA PRESENT: Primary | ICD-10-CM

## 2025-02-27 ENCOUNTER — APPOINTMENT (OUTPATIENT)
Dept: PRIMARY CARE | Facility: CLINIC | Age: 40
End: 2025-02-27
Payer: COMMERCIAL

## 2025-02-28 ENCOUNTER — APPOINTMENT (OUTPATIENT)
Dept: PRIMARY CARE | Facility: CLINIC | Age: 40
End: 2025-02-28
Payer: COMMERCIAL

## 2025-02-28 VITALS
HEIGHT: 75 IN | WEIGHT: 270 LBS | BODY MASS INDEX: 33.57 KG/M2 | OXYGEN SATURATION: 96 % | SYSTOLIC BLOOD PRESSURE: 126 MMHG | DIASTOLIC BLOOD PRESSURE: 74 MMHG | HEART RATE: 103 BPM

## 2025-02-28 DIAGNOSIS — Q87.3: ICD-10-CM

## 2025-02-28 DIAGNOSIS — R29.2: ICD-10-CM

## 2025-02-28 DIAGNOSIS — G89.29 CHRONIC LOW BACK PAIN WITHOUT SCIATICA, UNSPECIFIED BACK PAIN LATERALITY: ICD-10-CM

## 2025-02-28 DIAGNOSIS — M54.50 CHRONIC LOW BACK PAIN WITHOUT SCIATICA, UNSPECIFIED BACK PAIN LATERALITY: ICD-10-CM

## 2025-02-28 DIAGNOSIS — Z00.00 ANNUAL PHYSICAL EXAM: Primary | ICD-10-CM

## 2025-02-28 DIAGNOSIS — R03.0 ELEVATED BP WITHOUT DIAGNOSIS OF HYPERTENSION: ICD-10-CM

## 2025-02-28 DIAGNOSIS — E66.9 OBESITY (BMI 30-39.9): ICD-10-CM

## 2025-02-28 DIAGNOSIS — R20.1: ICD-10-CM

## 2025-02-28 NOTE — PROGRESS NOTES
Subjective   Patient ID: Zach Cuellar is a 39 y.o. male who presents for the following    Assessment/Plan   Preventative Medicine  -Had 5 covid shots   -Declining flu shot at this time. Will get for 2026 season.   -PHQ2: 0  -Alcohol Screen     Obesity (BMI 33)  -Discussed calorie counting. Currently BMR is around 2800 calories.   PLAN  -Add 30 minutes of aerobic exercise at least 3x weekly  -2000 calorie daily restriction advised  -Discussed possibly supplementing adipex vs GLP1RA. Wants to try GLP1RA. Referral made to clinical pharmacy to see which GLP1RA would work best for him.   -Side effect profile of GLP-1 RA as discussed, concerns addressed.  Advised to see small meals and stay well-hydrated.  -Return to clinic 1 month after starting GLP-1 RA  ->15 minutes on obesity counseling     Elevated BP without HTN Dx   -Repeat 126/74  -Low salt diet  -Advise ambulatory BP monitoring. Send readings to our office     Chronic Low Back Pain  -Currently following with pain management and undergoing workup  -Has completed course of PT already with minimal improvement.   -Advise stretching and staying mobile  -Avoid heavy lifting.       HPI  39M presents to establish care, annual physical, acute visit.     Primarily wants to discuss weight loss.  States that he hurt his back in 2023 and has had intermittent low back pain since then.  He currently follows with pain management currently undergoing workup.  Denies any radiculopathy, myelopathy, groin anesthesia, bowel or bladder incontinence.  States that since his back injury he has had difficulty maintaining weight and has slowly been gaining weight.  Typically enjoys playing tennis but states that whenever he plays he has prolonged lumbar back pain afterwards.  Discussed various methods of weight loss and patient would like to try GLP-1 RA.  Side effect profile discussed, concerns addressed.    Wants to discuss weight loss. States that he has slowly gained weight since he  "hurt his back last year. 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   -  -No kids         Social Drivers of Health     Tobacco Use: Low Risk  (2/28/2025)    Patient History     Smoking Tobacco Use: Never     Smokeless Tobacco Use: Never     Passive Exposure: Not on file   Alcohol Use: Not on file   Financial Resource Strain: Not on file   Food Insecurity: Not on file   Transportation Needs: Not on file   Physical Activity: Not on file   Stress: Not on file   Social Connections: Not on file   Intimate Partner Violence: Not on file   Depression: Not on file   Housing Stability: Not on file   Utilities: Not on file   Digital Equity: Not on file   Health Literacy: Not on file         Visit Vitals  /78   Pulse 103   Ht 1.905 m (6' 3\")   Wt 122 kg (270 lb)   SpO2 96%   BMI 33.75 kg/m²   Smoking Status Never   BSA 2.54 m²     PHYSICAL EXAM   Physical Exam       General: NAD. NCAT. Aox3. Obese  HEENT: PERRLA. EOMI. MMM. Nares patent bl.  Cardiovascular: RRR. No MRG. S1/S2 wnl.   Respiratory: CTABL. No acute respiratory distress.   GI: Soft, NT abdomen. BS present x 4.   : No CVAT BL  MSK: ROM x 4. CTLS non-tender. Chronic lumbar paraspinal pain.   Extremities: No edema. Cap refill < 2 sec.   Skin: No rashes or bruises.   Neuro: Aox3. Cranial Nerves grossly intact. Motor/sensory wnl.   Psych: Mood wnl.      REVIEW OF SYSTEMS   ROS IN HPI     Allergies   Allergen Reactions    Pollen Extracts Hives     Runny nose, itchy eyes       No current outpatient medications on file.     No current facility-administered medications for this visit.       Objective     No visits with results within 4 Month(s) from this visit.   Latest known visit with results is:   No results found for any " previous visit.       Radiology: Reviewed imaging in powerchart.  No results found.    No family history on file.  Social History     Socioeconomic History    Marital status: Single   Tobacco Use    Smoking status: Never    Smokeless tobacco: Never   Substance and Sexual Activity    Alcohol use: Yes     Comment: Rarely    Drug use: Never    Sexual activity: Yes     Partners: Female     No past medical history on file.  No past surgical history on file.    Charting was completed using voice recognition technology and may include unintended errors.

## 2025-03-01 LAB
ALBUMIN SERPL-MCNC: 4.9 G/DL (ref 3.6–5.1)
ALP SERPL-CCNC: 58 U/L (ref 36–130)
ALT SERPL-CCNC: 45 U/L (ref 9–46)
ANION GAP SERPL CALCULATED.4IONS-SCNC: 10 MMOL/L (CALC) (ref 7–17)
AST SERPL-CCNC: 13 U/L (ref 10–40)
BASOPHILS # BLD AUTO: 18 CELLS/UL (ref 0–200)
BASOPHILS NFR BLD AUTO: 0.2 %
BILIRUB SERPL-MCNC: 0.8 MG/DL (ref 0.2–1.2)
BUN SERPL-MCNC: 13 MG/DL (ref 7–25)
CALCIUM SERPL-MCNC: 10.1 MG/DL (ref 8.6–10.3)
CHLORIDE SERPL-SCNC: 101 MMOL/L (ref 98–110)
CHOLEST SERPL-MCNC: 249 MG/DL
CHOLEST/HDLC SERPL: 7.3 (CALC)
CO2 SERPL-SCNC: 28 MMOL/L (ref 20–32)
CREAT SERPL-MCNC: 1.11 MG/DL (ref 0.6–1.26)
EGFRCR SERPLBLD CKD-EPI 2021: 87 ML/MIN/1.73M2
EOSINOPHIL # BLD AUTO: 26 CELLS/UL (ref 15–500)
EOSINOPHIL NFR BLD AUTO: 0.3 %
ERYTHROCYTE [DISTWIDTH] IN BLOOD BY AUTOMATED COUNT: 12.6 % (ref 11–15)
EST. AVERAGE GLUCOSE BLD GHB EST-MCNC: 123 MG/DL
EST. AVERAGE GLUCOSE BLD GHB EST-SCNC: 6.8 MMOL/L
GLUCOSE SERPL-MCNC: 95 MG/DL (ref 65–139)
HBA1C MFR BLD: 5.9 % OF TOTAL HGB
HCT VFR BLD AUTO: 44.1 % (ref 38.5–50)
HDLC SERPL-MCNC: 34 MG/DL
HGB BLD-MCNC: 15.1 G/DL (ref 13.2–17.1)
LDLC SERPL CALC-MCNC: 171 MG/DL (CALC)
LYMPHOCYTES # BLD AUTO: 1522 CELLS/UL (ref 850–3900)
LYMPHOCYTES NFR BLD AUTO: 17.3 %
MCH RBC QN AUTO: 30.9 PG (ref 27–33)
MCHC RBC AUTO-ENTMCNC: 34.2 G/DL (ref 32–36)
MCV RBC AUTO: 90.2 FL (ref 80–100)
MONOCYTES # BLD AUTO: 625 CELLS/UL (ref 200–950)
MONOCYTES NFR BLD AUTO: 7.1 %
NEUTROPHILS # BLD AUTO: 6609 CELLS/UL (ref 1500–7800)
NEUTROPHILS NFR BLD AUTO: 75.1 %
NONHDLC SERPL-MCNC: 215 MG/DL (CALC)
PLATELET # BLD AUTO: 274 THOUSAND/UL (ref 140–400)
PMV BLD REES-ECKER: 10.8 FL (ref 7.5–12.5)
POTASSIUM SERPL-SCNC: 4.4 MMOL/L (ref 3.5–5.3)
PROT SERPL-MCNC: 7.7 G/DL (ref 6.1–8.1)
RBC # BLD AUTO: 4.89 MILLION/UL (ref 4.2–5.8)
SODIUM SERPL-SCNC: 139 MMOL/L (ref 135–146)
TRIGL SERPL-MCNC: 266 MG/DL
TSH SERPL-ACNC: 0.88 MIU/L (ref 0.4–4.5)
WBC # BLD AUTO: 8.8 THOUSAND/UL (ref 3.8–10.8)

## 2025-03-04 ENCOUNTER — TELEPHONE (OUTPATIENT)
Dept: PRIMARY CARE | Facility: CLINIC | Age: 40
End: 2025-03-04
Payer: COMMERCIAL

## 2025-03-04 NOTE — TELEPHONE ENCOUNTER
----- Message from Benoit Whalen sent at 3/3/2025  5:05 PM EST -----  Pre-diabetic. Can either cut down carbs significantly and repeat in 3-4 months or can continue to work on cutting down carbs and we can start medication to lower glucose. If he is interested in medicine, please set up visit to discuss starting medication.     Elevated lipids. Recommend low fat, low carb diet and 30 minutes of aerobic exercise at least 3 times weekly. Follow up in 6-12 months for repeat lipids.

## 2025-03-04 NOTE — TELEPHONE ENCOUNTER
Spoke to patient informed him of results. He stated when he was in office on 2/28/25 he had discussed starting GLP-1 medication and thought this was going to be sent in - wants to know if this could help with pre diabetes. He is also scheduled with pharmacist Mirian on Friday to possibly discuss this as well. Please advise.

## 2025-03-05 NOTE — TELEPHONE ENCOUNTER
Lvm - please inform pt his appointment for this Friday with the pharmacist is to go over which GLP meds are covered by his insurance. Once that is determined whichever med is covered will be prescribed at that time.

## 2025-03-06 NOTE — PROGRESS NOTES
Clinical Pharmacy Appointment    Patient ID: Zach Cuellar is a 39 y.o. male who presents for Weight Management.    Pt is here for First appointment.     Referring Provider: Benoit Whalen MD  PCP: Benoit Whalen MD   Last visit with PCP: 2/28/25   Next visit with PCP: 3/21/25    Subjective     HPI    Patient is a 39 y.o. male presenting to an initial clinical pharmacy visit for weight management. Baseline BMI 33.75 kg/m2. Baseline weight 270 lbs. Overall weight loss goal would be back pain relief.     Diet:   - Works from home -- snacking  B: Coffee   L: Pre-made salads from [employer]   D: eats out italian food pizza pasta  Sn: chips, pretzels, apples, banana   Dr: No sugar drinks     Exercise  - tennis and pickleball (was 3-4x per week but now 2-3)   - Walks dog - couple miles lap around [place]   - back pain limiting     No hx of pancreatitis, MTC, MEN2    Medication System Management  Patient's preferred pharmacy: [id number]  Affordability/Accessibility: Mounjaro/Ozempic/Trulicity require T2DM. Wegovy/Zepbound plan/benefit exclusion. Zepbound coupon for $650/month.      Objective   Allergies   Allergen Reactions    Pollen Extracts Hives     Runny nose, itchy eyes     Social History     Social History Narrative    Not on file      Medication Review  Current Outpatient Medications   Medication Instructions    tirzepatide (weight loss) (ZEPBOUND) 2.5 mg, subcutaneous, Every 7 days      Vitals  BP Readings from Last 2 Encounters:   02/28/25 126/74   10/17/24 138/86     BMI Readings from Last 1 Encounters:   02/28/25 33.75 kg/m²      Labs  A1C  Lab Results   Component Value Date    HGBA1C 5.9 (H) 02/28/2025     BMP  Lab Results   Component Value Date    CALCIUM 10.1 02/28/2025     02/28/2025    K 4.4 02/28/2025    CO2 28 02/28/2025     02/28/2025    BUN 13 02/28/2025    CREATININE 1.11 02/28/2025    EGFR 87 02/28/2025     LFTs  Lab Results   Component Value Date    ALT 45 02/28/2025     "AST 13 02/28/2025    ALKPHOS 58 02/28/2025    BILITOT 0.8 02/28/2025     FLP  Lab Results   Component Value Date    TRIG 266 (H) 02/28/2025    CHOL 249 (H) 02/28/2025    LDLCALC 171 (H) 02/28/2025    HDL 34 (L) 02/28/2025     Urine Microalbumin  No results found for: \"MICROALBCREA\"  Weight Management  Wt Readings from Last 3 Encounters:   02/28/25 122 kg (270 lb)   10/17/24 109 kg (240 lb)   06/07/24 112 kg (248 lb)      There is no height or weight on file to calculate BMI.     Zepbound Education:   Counseled patient on Zepbound MOA, expectations, side effects, duration of therapy, administration, and monitoring parameters.  Provided detailed dosing and administration counseling to ensure proper technique.   Reviewed Zepbound titration schedule, starting with 2.5 mg once weekly to a goal of 15 mg once weekly if tolerated  Counseled patient on the benefits of GLP-1ra glycemic control and weight loss  Reviewed storage requirements of Zepbound when not in use, and when to administer the medication if a dose is missed.  Advised patient that they may experience improved satiety after meals and portion sizes of meals may be reduced as doses of Zepound increase.    Assessment/Plan   Problem List Items Addressed This Visit    None  Visit Diagnoses       Obesity (BMI 30-39.9)        Relevant Medications    tirzepatide, weight loss, (Zepbound) 2.5 mg/0.5 mL injection    Other Relevant Orders    Referral to Clinical Pharmacy        GLP1s not covered by insurance. Patient willing to pay cash. Provided education the medication works best in combination with lifestyle adjustments. Patient currently limited by pain.   START Zepbound 2.5 mg once weekly injection  Prescription sent to [facility] #[id number] [place], [place]   Sent Patient link to sign up for Zepbound coupon to get the medication for $650/month  CONTINUE making healthy diet and lifestyle adjustments  Provided patient with [facility] phone number for any additional questions or " concerns [phone]    Follow up with Clinical Pharmacy Team 4/4/25 at 10:20am     Time spent with pt: Total length of time 40 (minutes) of the encounter and more than 50% was spent counseling the patient.    Continue all meds under the continuation of care with the referring provider and clinical pharmacy team.    Please reach out to the Clinical Pharmacy Team if there are any further questions.     Verbal consent to manage patient's drug therapy was obtained from patient. They were informed they may decline to participate or withdraw from participation in pharmacy services at any time.    Mirian Lomax, PharmD  Clinical Pharmacy Specialist   [phone]

## 2025-03-07 ENCOUNTER — TELEMEDICINE (OUTPATIENT)
Dept: PHARMACY | Facility: HOSPITAL | Age: 40
End: 2025-03-07
Payer: COMMERCIAL

## 2025-03-07 DIAGNOSIS — E66.9 OBESITY (BMI 30-39.9): ICD-10-CM

## 2025-04-04 ENCOUNTER — APPOINTMENT (OUTPATIENT)
Dept: PHARMACY | Facility: HOSPITAL | Age: 40
End: 2025-04-04
Payer: COMMERCIAL

## 2025-04-04 DIAGNOSIS — E66.9 OBESITY (BMI 30-39.9): ICD-10-CM

## 2025-04-04 NOTE — PROGRESS NOTES
Clinical Pharmacy Appointment    Patient ID: Zach Cuellar is a 39 y.o. male who presents for Weight Management.    Pt is here for Follow Up appointment.     Referring Provider: Benoit Whalen MD  PCP: Benoit Whalen MD   Last visit with PCP: 2/28/25   Next visit with PCP: Not scheduled     Subjective     HPI    Patient is a 39 y.o. male presenting to a follow up clinical pharmacy visit for weight management. Baseline BMI 33.75 kg/m2. Baseline weight 270 lbs. Overall weight loss goal would be back pain relief.     At the last visit he was started on Zepbound 2.5 mg once weekly. Denied any side effects with the medication. Has taken 4 doses. Takes dose on Tuesdays and starts to feel effect wear off by Saturday/Sunday. Being mindful on the days it wears off to not snack. Reported it has signicianlty helped with his daytime snacking during work. Endorsed starting to lose weight however did not specify an amount.     Diet:   - Works from home   - Decreased snacking since last visit   - Decreased carb intake   B: Coffee   L: Pre-made salads from [employer]   D: eats out italian food pizza pasta  Sn: chips, pretzels, apples, banana   Dr: No sugar drinks     Exercise  - tennis and pickleball (was 3-4x per week but now 2-3)   - Walks dog - couple miles lap around [place]   - back pain limiting     No hx of pancreatitis, MTC, MEN2    Medication System Management  Patient's preferred pharmacy: [facility] #[id number]  Affordability/Accessibility: Mounjaro/Ozempic/Trulicity require T2DM. Wegovy/Zepbound plan/benefit exclusion. Zepbound coupon for $650/month.      Objective   Allergies   Allergen Reactions    Pollen Extracts Hives     Runny nose, itchy eyes     Social History     Social History Narrative    Not on file      Medication Review  Current Outpatient Medications   Medication Instructions    tirzepatide (weight loss) (ZEPBOUND) 5 mg, subcutaneous, Every 7 days      Vitals  BP Readings from Last 2 Encounters:  "  02/28/25 126/74   10/17/24 138/86     BMI Readings from Last 1 Encounters:   02/28/25 33.75 kg/m²      Labs  A1C  Lab Results   Component Value Date    HGBA1C 5.9 (H) 02/28/2025     BMP  Lab Results   Component Value Date    CALCIUM 10.1 02/28/2025     02/28/2025    K 4.4 02/28/2025    CO2 28 02/28/2025     02/28/2025    BUN 13 02/28/2025    CREATININE 1.11 02/28/2025    EGFR 87 02/28/2025     LFTs  Lab Results   Component Value Date    ALT 45 02/28/2025    AST 13 02/28/2025    ALKPHOS 58 02/28/2025    BILITOT 0.8 02/28/2025     FLP  Lab Results   Component Value Date    TRIG 266 (H) 02/28/2025    CHOL 249 (H) 02/28/2025    LDLCALC 171 (H) 02/28/2025    HDL 34 (L) 02/28/2025     Urine Microalbumin  No results found for: \"MICROALBCREA\"  Weight Management  Wt Readings from Last 3 Encounters:   02/28/25 122 kg (270 lb)   10/17/24 109 kg (240 lb)   06/07/24 112 kg (248 lb)      There is no height or weight on file to calculate BMI.     Zepbound Education:   Counseled patient on Zepbound MOA, expectations, side effects, duration of therapy, administration, and monitoring parameters.  Provided detailed dosing and administration counseling to ensure proper technique.   Reviewed Zepbound titration schedule, starting with 2.5 mg once weekly to a goal of 15 mg once weekly if tolerated  Counseled patient on the benefits of GLP-1ra glycemic control and weight loss  Reviewed storage requirements of Zepbound when not in use, and when to administer the medication if a dose is missed.  Advised patient that they may experience improved satiety after meals and portion sizes of meals may be reduced as doses of Zepound increase.    Assessment/Plan   Problem List Items Addressed This Visit    None  Visit Diagnoses       Obesity (BMI 30-39.9)        Relevant Medications    tirzepatide, weight loss, (Zepbound) 5 mg/0.5 mL injection    Other Relevant Orders    Referral to Clinical Pharmacy        Patient doing well with " Zepbound. Denied side effects. Therefore, will continue to increase the dose for increased weight loss benefit.  INCREASE Zepbound 5 mg once weekly injection  Prescription sent to [facility] #[id number] [place], [place]   CONTINUE making healthy diet and lifestyle adjustments  Provided patient with [facility] phone number for any additional questions or concerns [phone]    Follow up with Clinical Pharmacy Team 4/25/25 at 10:20am     Time spent with pt: Total length of time 15 (minutes) of the encounter and more than 50% was spent counseling the patient.    Continue all meds under the continuation of care with the referring provider and clinical pharmacy team.    Please reach out to the Clinical Pharmacy Team if there are any further questions.     Verbal consent to manage patient's drug therapy was obtained from patient. They were informed they may decline to participate or withdraw from participation in pharmacy services at any time.    Mirian Lomax, PharmD  Clinical Pharmacy Specialist   [phone]

## 2025-04-25 ENCOUNTER — APPOINTMENT (OUTPATIENT)
Dept: PHARMACY | Facility: HOSPITAL | Age: 40
End: 2025-04-25
Payer: COMMERCIAL

## 2025-04-25 DIAGNOSIS — E66.9 OBESITY (BMI 30-39.9): ICD-10-CM

## 2025-04-25 RX ORDER — TIRZEPATIDE 7.5 MG/.5ML
7.5 INJECTION, SOLUTION SUBCUTANEOUS
Refills: 1 | OUTPATIENT
Start: 2025-04-27

## 2025-04-25 NOTE — PROGRESS NOTES
Clinical Pharmacy Appointment    Patient ID: Zach Cuellar is a 39 y.o. male who presents for Weight Management.    Pt is here for Follow Up appointment.     Referring Provider: Benoit Whalen MD  PCP: Benoit Whalen MD   Last visit with PCP: 2/28/25   Next visit with PCP: Not scheduled     Subjective     HPI    Patient is a 39 y.o. male presenting to a follow up clinical pharmacy visit for weight management. Baseline BMI 33.75 kg/m2. Baseline weight 270 lbs. Current weight 248 lbs. Overall weight loss goal would be back pain relief.     At the last visit he was increased to Zepbound 5 mg once weekly. Denied any side effects with the medication. Has taken 3 doses. Takes dose on Tuesdays and starts to feel effect wear off by Sunday/Monday. Being mindful on the days it wears off to not snack. Reported it has signicianlty helped with his daytime snacking during work. Endorsed starting to lose weight however did not specify an amount.     Diet:   - Works from home   - Decreased snacking since last visit   - Decreased carb intake   - Decreased cravings   B: Coffee   L: Pre-made salads from [employer]   D: eats out italian food pizza pasta  Sn: chips, pretzels, apples, banana   Dr: No sugar drinks     Exercise  - tennis and pickleball (was 3-4x per week but now 2-3)   - Walks dog - couple miles lap around [place]   - back pain limiting   - Car racing - cardio    No hx of pancreatitis, MTC, MEN2    Medication System Management  Patient's preferred pharmacy: [facility] #[id number]  Affordability/Accessibility: Mounjaro/Ozempic/Trulicity require T2DM. Wegovy/Zepbound plan/benefit exclusion. Zepbound coupon for $650/month.      Objective   Allergies   Allergen Reactions    Pollen Extracts Hives     Runny nose, itchy eyes     Social History     Social History Narrative    Not on file      Medication Review  Current Outpatient Medications   Medication Instructions    tirzepatide (weight loss) (ZEPBOUND) 7.5 mg,  "subcutaneous, Every 7 days      Vitals  BP Readings from Last 2 Encounters:   02/28/25 126/74   10/17/24 138/86     BMI Readings from Last 1 Encounters:   02/28/25 33.75 kg/m²      Labs  A1C  Lab Results   Component Value Date    HGBA1C 5.9 (H) 02/28/2025     BMP  Lab Results   Component Value Date    CALCIUM 10.1 02/28/2025     02/28/2025    K 4.4 02/28/2025    CO2 28 02/28/2025     02/28/2025    BUN 13 02/28/2025    CREATININE 1.11 02/28/2025    EGFR 87 02/28/2025     LFTs  Lab Results   Component Value Date    ALT 45 02/28/2025    AST 13 02/28/2025    ALKPHOS 58 02/28/2025    BILITOT 0.8 02/28/2025     FLP  Lab Results   Component Value Date    TRIG 266 (H) 02/28/2025    CHOL 249 (H) 02/28/2025    LDLCALC 171 (H) 02/28/2025    HDL 34 (L) 02/28/2025     Urine Microalbumin  No results found for: \"MICROALBCREA\"  Weight Management  Wt Readings from Last 3 Encounters:   02/28/25 122 kg (270 lb)   10/17/24 109 kg (240 lb)   06/07/24 112 kg (248 lb)      There is no height or weight on file to calculate BMI.     Zepbound Education:   Counseled patient on Zepbound MOA, expectations, side effects, duration of therapy, administration, and monitoring parameters.  Provided detailed dosing and administration counseling to ensure proper technique.   Reviewed Zepbound titration schedule, starting with 2.5 mg once weekly to a goal of 15 mg once weekly if tolerated  Counseled patient on the benefits of GLP-1ra glycemic control and weight loss  Reviewed storage requirements of Zepbound when not in use, and when to administer the medication if a dose is missed.  Advised patient that they may experience improved satiety after meals and portion sizes of meals may be reduced as doses of Zepound increase.    Assessment/Plan   Problem List Items Addressed This Visit    None  Visit Diagnoses         Obesity (BMI 30-39.9)        Relevant Medications    tirzepatide, weight loss, (Zepbound) 7.5 mg/0.5 mL injection    Other " Relevant Orders    Referral to Clinical Pharmacy        Patient doing well with Zepbound. Denied side effects. Therefore, will continue to increase the dose for increased weight loss benefit.  INCREASE Zepbound 7.5 mg once weekly injection  Prescription sent to [facility] #[id number] [place], [place]   CONTINUE making healthy diet and lifestyle adjustments  Provided patient with [facility] phone number for any additional questions or concerns [phone]    Follow up with Clinical Pharmacy Team 5/23/25 at 10:20am     Time spent with pt: Total length of time 15 (minutes) of the encounter and more than 50% was spent counseling the patient.    Continue all meds under the continuation of care with the referring provider and clinical pharmacy team.    Please reach out to the Clinical Pharmacy Team if there are any further questions.     Verbal consent to manage patient's drug therapy was obtained from patient. They were informed they may decline to participate or withdraw from participation in pharmacy services at any time.    Mirian Lomax, PharmD  Clinical Pharmacy Specialist   [phone]

## 2025-05-22 NOTE — PROGRESS NOTES
Clinical Pharmacy Appointment    Patient ID: Zach Cuellar is a 39 y.o. male who presents for Weight Management.    Pt is here for Follow Up appointment.     Referring Provider: Benoit Whalen MD  PCP: Benoit Whalen MD   Last visit with PCP: 2/28/25   Next visit with PCP: Not scheduled     Subjective     HPI    Patient is a 39 y.o. male presenting to a follow up clinical pharmacy visit for weight management. Baseline BMI 33.75 kg/m2. Baseline weight 270 lbs. Current weight 238 lbs. Overall weight loss goal would be back pain relief. However, would like to get down to 200-220 lbs.     At the last visit he was increased to Zepbound 7.5 mg once weekly. Denied any side effects with the medication. Has taken 3 doses. Takes dose on Tuesdays and starts to feel effect wear off by Friday/Saturday. Being mindful on the days it wears off to not snack. Reported it has signicianlty helped with his daytime snacking during work.    Diet:   - Works from home   - Decreased snacking since last visit   - Decreased carb intake   - Decreased cravings   B: Coffee   L: Pre-made salads from [employer]   D: eats out italian food pizza pasta  Sn: chips, pretzels, apples, banana   Dr: No sugar drinks     Exercise  - tennis and pickleball (was 3-4x per week but now 2-3)   - Walks dog - couple miles lap around [place]   - back pain limiting   - Car racing - cardio    No hx of pancreatitis, MTC, MEN2    Medication System Management  Patient's preferred pharmacy: [facility] #[id number] [facility]  Affordability/Accessibility: Mounjaro/Ozempic/Trulicity require T2DM. Wegovy/Zepbound plan/benefit exclusion. Zepbound coupon for $650/month.      Objective   Allergies   Allergen Reactions    Pollen Extracts Hives     Runny nose, itchy eyes     Social History     Social History Narrative    Not on file      Medication Review  Current Outpatient Medications   Medication Instructions    tirzepatide (weight loss) (ZEPBOUND) 10 mg, subcutaneous, Every 7 days     "  Vitals  BP Readings from Last 2 Encounters:   02/28/25 126/74   10/17/24 138/86     BMI Readings from Last 1 Encounters:   02/28/25 33.75 kg/m²      Labs  A1C  Lab Results   Component Value Date    HGBA1C 5.9 (H) 02/28/2025     BMP  Lab Results   Component Value Date    CALCIUM 10.1 02/28/2025     02/28/2025    K 4.4 02/28/2025    CO2 28 02/28/2025     02/28/2025    BUN 13 02/28/2025    CREATININE 1.11 02/28/2025    EGFR 87 02/28/2025     LFTs  Lab Results   Component Value Date    ALT 45 02/28/2025    AST 13 02/28/2025    ALKPHOS 58 02/28/2025    BILITOT 0.8 02/28/2025     FLP  Lab Results   Component Value Date    TRIG 266 (H) 02/28/2025    CHOL 249 (H) 02/28/2025    LDLCALC 171 (H) 02/28/2025    HDL 34 (L) 02/28/2025     Urine Microalbumin  No results found for: \"MICROALBCREA\"  Weight Management  Wt Readings from Last 3 Encounters:   02/28/25 122 kg (270 lb)   10/17/24 109 kg (240 lb)   06/07/24 112 kg (248 lb)      There is no height or weight on file to calculate BMI.     Zepbound Education:   Counseled patient on Zepbound MOA, expectations, side effects, duration of therapy, administration, and monitoring parameters.  Provided detailed dosing and administration counseling to ensure proper technique.   Reviewed Zepbound titration schedule, starting with 2.5 mg once weekly to a goal of 15 mg once weekly if tolerated  Counseled patient on the benefits of GLP-1ra glycemic control and weight loss  Reviewed storage requirements of Zepbound when not in use, and when to administer the medication if a dose is missed.  Advised patient that they may experience improved satiety after meals and portion sizes of meals may be reduced as doses of Zepound increase.    Assessment/Plan   Problem List Items Addressed This Visit    None  Visit Diagnoses         Obesity (BMI 30-39.9)        Relevant Medications    tirzepatide, weight loss, (Zepbound) 10 mg/0.5 mL injection    Other Relevant Orders    Referral to " Clinical Pharmacy        Patient doing well with Zepbound. Denied side effects. Therefore, will continue to increase the dose for increased weight loss benefit.  INCREASE Zepbound 10 mg once weekly injection  Prescription sent to [facility] #[id number] [place], [place]   CONTINUE making healthy diet and lifestyle adjustments  Provided patient with [facility] phone number for any additional questions or concerns [phone]    Follow up with Clinical Pharmacy Team 6/20/25 at 10:20am     Time spent with pt: Total length of time 15 (minutes) of the encounter and more than 50% was spent counseling the patient.    Continue all meds under the continuation of care with the referring provider and clinical pharmacy team.    Please reach out to the Clinical Pharmacy Team if there are any further questions.     Verbal consent to manage patient's drug therapy was obtained from patient. They were informed they may decline to participate or withdraw from participation in pharmacy services at any time.    Mirian Lomax, PharmD  Clinical Pharmacy Specialist   [phone]

## 2025-05-23 ENCOUNTER — APPOINTMENT (OUTPATIENT)
Dept: PHARMACY | Facility: HOSPITAL | Age: 40
End: 2025-05-23
Payer: COMMERCIAL

## 2025-05-23 DIAGNOSIS — E66.9 OBESITY (BMI 30-39.9): ICD-10-CM

## 2025-06-08 ENCOUNTER — OFFICE VISIT (OUTPATIENT)
Dept: URGENT CARE | Age: 40
End: 2025-06-08
Payer: COMMERCIAL

## 2025-06-08 ENCOUNTER — ANCILLARY PROCEDURE (OUTPATIENT)
Dept: URGENT CARE | Age: 40
End: 2025-06-08
Payer: COMMERCIAL

## 2025-06-08 VITALS
RESPIRATION RATE: 18 BRPM | OXYGEN SATURATION: 99 % | WEIGHT: 228 LBS | HEART RATE: 82 BPM | SYSTOLIC BLOOD PRESSURE: 107 MMHG | DIASTOLIC BLOOD PRESSURE: 77 MMHG | TEMPERATURE: 97 F | BODY MASS INDEX: 28.5 KG/M2

## 2025-06-08 DIAGNOSIS — S99.921A INJURY OF RIGHT FOOT, INITIAL ENCOUNTER: Primary | ICD-10-CM

## 2025-06-08 DIAGNOSIS — S92.351A CLOSED FRACTURE OF BASE OF FIFTH METATARSAL BONE OF RIGHT FOOT, INITIAL ENCOUNTER: Primary | ICD-10-CM

## 2025-06-08 DIAGNOSIS — S99.921A INJURY OF RIGHT FOOT, INITIAL ENCOUNTER: ICD-10-CM

## 2025-06-08 PROCEDURE — 99204 OFFICE O/P NEW MOD 45 MIN: CPT

## 2025-06-08 PROCEDURE — L4361 PNEUMA/VAC WALK BOOT PRE OTS: HCPCS

## 2025-06-08 PROCEDURE — E0114 CRUTCH UNDERARM PAIR NO WOOD: HCPCS

## 2025-06-08 PROCEDURE — 73630 X-RAY EXAM OF FOOT: CPT | Mod: RIGHT SIDE

## 2025-06-08 RX ORDER — NAPROXEN 500 MG/1
500 TABLET ORAL EVERY 12 HOURS PRN
Qty: 20 TABLET | Refills: 0 | Status: SHIPPED | OUTPATIENT
Start: 2025-06-08 | End: 2025-06-18

## 2025-06-09 ENCOUNTER — OFFICE VISIT (OUTPATIENT)
Dept: ORTHOPEDIC SURGERY | Facility: CLINIC | Age: 40
End: 2025-06-09
Payer: COMMERCIAL

## 2025-06-09 DIAGNOSIS — S92.351A CLOSED FRACTURE OF BASE OF FIFTH METATARSAL BONE OF RIGHT FOOT: Primary | ICD-10-CM

## 2025-06-09 PROCEDURE — 99213 OFFICE O/P EST LOW 20 MIN: CPT | Performed by: ORTHOPAEDIC SURGERY

## 2025-06-09 PROCEDURE — 28470 CLTX METATARSAL FX WO MNP EA: CPT | Performed by: ORTHOPAEDIC SURGERY

## 2025-06-09 ASSESSMENT — ENCOUNTER SYMPTOMS
ARTHRALGIAS: 1
WOUND: 0
JOINT SWELLING: 1
NUMBNESS: 0
COLOR CHANGE: 0

## 2025-06-09 NOTE — PROGRESS NOTES
Subjective   Patient ID: Zach Cuellar is a 39 y.o. male. They present today with a chief complaint of Injury (Jumped down 1 foot- landed wrong heard a pop and crunch - 6-8/10).    HISTORY OF PRESENT ILLNESS:    - presents to clinic for right foot injury  - reports jumping from height of about 2-3 feet. Landed awkwardly on the right foot, everting the foot  - states he heard a crack at the time  - reports lateral pain and swelling, and difficulty bearing weight  - no prior fractures/dislocations of this foot    Past Medical History  Allergies as of 06/08/2025 - Reviewed 06/08/2025   Allergen Reaction Noted    Pollen extracts Hives 06/26/2015       Current Outpatient Medications   Medication Instructions    naproxen (NAPROSYN) 500 mg, oral, Every 12 hours PRN, Take with food. Do not take with other NSAID medications (ie., ibuprofen, Aleve, aspirin, etc.)    tirzepatide (weight loss) (ZEPBOUND) 10 mg, subcutaneous, Every 7 days         Medical History[1]    Surgical History[2]     reports that he has never smoked. He has never used smokeless tobacco. He reports current alcohol use. He reports that he does not use drugs.    Review of Systems    Review of Systems   Musculoskeletal:  Positive for arthralgias and joint swelling.        R foot   Skin:  Negative for color change and wound.   Neurological:  Negative for numbness.                                 Objective    Vitals:    06/08/25 1536   BP: 107/77   Pulse: 82   Resp: 18   Temp: 36.1 °C (97 °F)   SpO2: 99%   Weight: 103 kg (228 lb)     No LMP for male patient.  PHYSICAL EXAMINATION:    Physical Exam  Vitals and nursing note reviewed.   Constitutional:       General: He is not in acute distress.     Appearance: Normal appearance. He is not ill-appearing.   HENT:      Head: Normocephalic and atraumatic.      Nose: Nose normal.   Eyes:      General:         Right eye: No discharge.         Left eye: No discharge.      Extraocular Movements: Extraocular movements  intact.      Conjunctiva/sclera: Conjunctivae normal.   Cardiovascular:      Rate and Rhythm: Normal rate.   Pulmonary:      Effort: Pulmonary effort is normal. No respiratory distress.   Musculoskeletal:      Cervical back: Normal range of motion and neck supple.      Right ankle: No swelling or deformity. No tenderness.      Right Achilles Tendon: Normal. No tenderness or defects.      Right foot: Decreased range of motion. Swelling and tenderness present. No deformity or laceration.        Feet:       Comments: RLE neurovascularly intact.   Skin:     General: Skin is warm and dry.   Neurological:      General: No focal deficit present.      Mental Status: He is alert and oriented to person, place, and time.   Psychiatric:         Mood and Affect: Mood normal.         Behavior: Behavior normal.          Procedures    No results found for this visit on 06/08/25.    === 06/08/25 ===    XR FOOT 3+ VIEWS RIGHT    - Impression -  Acute transverse fracture of the base of the 5th metatarsal with  overlying soft tissue swelling.      MACRO:  None    Signed by: Alexei Forman 6/8/2025 4:28 PM  Dictation workstation:   [id number]     Diagnostic study results (if any) were reviewed by Nelida Riddle PA-C.    Assessment/Plan   Allergies, medications, history, and pertinent labs/EKGs/Imaging reviewed by Nelida Riddle PA-C.     Orders and Diagnoses  Diagnoses and all orders for this visit:  Closed fracture of base of fifth metatarsal bone of right foot, initial encounter  -     Walking boot, pneumatic  -     Crutches  -     Referral to Orthopedics and Sports Medicine; Future  -     naproxen (Naprosyn) 500 mg tablet; Take 1 tablet (500 mg) by mouth every 12 hours if needed (Pain) for up to 10 days. Take with food. Do not take with other NSAID medications (ie., ibuprofen, Aleve, aspirin, etc.)  Injury of right foot, initial encounter  -     XR foot right 3+ views; Future      Medical Admin Record    Given overall well  appearance, vital signs, history, and exam as above, there is no indication for further emergent testing/intervention at this time.      I discussed with the patient my clinical thoughts at this time given the above and we had a shared decision-making conversation in a patient-centered decision-making model on how to proceed forward. Pt was instructed on the importance of close follow-up. They were told that an urgent care diagnosis is often a preliminary impression and that definitive care is often not able to be given in the urgent care setting. Pt was educated that close follow-up is essential for good health and good outcomes. Patient was provided with the following instructions:         RICE. Crutches to assist ambulation as needed.     Naproxen as directed.      Follow up with orthopedics this week.        Clinical impression as well as limitations of available testing/examination, all discussed with patient. Pt is well at this time in the urgent care. They are comfortable with the present assessment and plan to be discharged home. Discussed with them close outpatient follow up, reassessment, and possible further testing/treatment via their PCP/specialist; they agree, understand, and are comfortable with this plan. Pt given the opportunity to ask questions prior to discharge and all questions were answered at this time. Via our discussion, the patient was advised of warning signs and instructions were reviewed. Strict ED precautions were given, acknowledged, and understood. Discussed with the patient/family that it is okay to return to the urgent care at any time for anything. Advised to present to the ED if present condition changes/worsens or if they develop new symptoms at any time after discharge. Also, advised to go to the ED if they cannot establish outpatient follow-up in time frame specified above. Pt verbalized understanding and agreement with plan and instructions. Discussed the need for close follow  up with their primary care provider and/or specialist for further testing/treatment/care/consultation in the short time frame as noted above - they understand these instructions and agree to close follow up for these reasons. Patient discharged home in stable condition.      Follow Up Instructions  No follow-ups on file.    Patient disposition: Home    Electronically signed by Nelida Riddle PA-C  11:11 AM         [1] No past medical history on file.  [2] No past surgical history on file.

## 2025-06-09 NOTE — PROGRESS NOTES
History of Present Illness  Chief Complaint   Patient presents with    Right Foot - Pain, Follow-up       Patient sustained a fall with resultant pain and difficulty placing weight on the right foot.  Patient was seen at the urgent care where he was diagnosed with base of fifth metatarsal fracture and instructed to follow-up in the orthopedic clinic.  Patient was placed into a cam walking boot as appropriate and was given crutches to aid in mobilization.      Medical History[1]    Medication Documentation Review Audit       Reviewed by Krys Aguilar MA (Medical Assistant) on 06/08/25 at 1538      Medication Order Taking? Sig Documenting Provider Last Dose Status   tirzepatide, weight loss, (Zepbound) 10 mg/0.5 mL injection [id number]  Inject 10 mg under the skin every 7 days. Benoit Whalen MD  Active                    RX Allergies[2]    Social History     Socioeconomic History    Marital status: Single     Spouse name: Not on file    Number of children: Not on file    Years of education: Not on file    Highest education level: Not on file   Occupational History    Not on file   Tobacco Use    Smoking status: Never    Smokeless tobacco: Never   Substance and Sexual Activity    Alcohol use: Yes     Comment: Rarely    Drug use: Never    Sexual activity: Yes     Partners: Female   Other Topics Concern    Not on file   Social History Narrative    Not on file     Social Drivers of Health     Financial Resource Strain: Not on file   Food Insecurity: Not on file   Transportation Needs: Not on file   Physical Activity: Not on file   Stress: Not on file   Social Connections: Not on file   Intimate Partner Violence: Not on file   Housing Stability: Not on file       Surgical History[3]         Review of Systems   GENERAL: Negative for malaise, significant weight loss, fever  MUSCULOSKELETAL: see HPI  NEURO:  Negative      Exam  Right lower extremity: No significant swelling or edema is appreciated.  Patient is tender to  palpation about the base of the fifth metatarsal.  Patient has no pain with eversion or inversion of the foot.  Patient has normal dorsiflexion plantarflexion compared to contralateral side and grossly neurovascular intact distally.     Imaging  XR foot right 3+ views  Narrative: Interpreted By:  Alexei Forman,   STUDY:  XR FOOT RIGHT 3+ VIEWS; ;  6/8/2025 4:05 pm      INDICATION:  Signs/Symptoms:Right foot injury x 1 day; everted foot; attention  fifth metatarsal.      ,S99.921A Unspecified injury of right foot, initial encounter      COMPARISON:  None.      ACCESSION NUMBER(S):  [id number]      ORDERING CLINICIAN:  ALEXIA SMILEY      FINDINGS:  Three views of the foot are obtained.      There is acute transverse fracture of the base of the 5th metatarsal  with overlying soft tissue swelling. Remainder of the osseous  structures are intact.      Impression: Acute transverse fracture of the base of the 5th metatarsal with  overlying soft tissue swelling.          MACRO:  None      Signed by: Alexei Forman 6/8/2025 4:28 PM  Dictation workstation:   [id number]         Assessment  Minimally displaced right base of fifth metatarsal fracture     Plan  Discussed treatment options patient ultimately recommended nonoperative management at this time.  Recommend nonweightbearing with Cam boot immobilization at all times exception of hygiene.  Patient should plan for at least 3 months of initial healing phase and then radiographic healing can occur up to 9 to 12 months later.  Did discuss that this can progress toward a fibrous nonunion which may or may not be symptomatic and as such may benefit from operative intervention in the future.    Follow-up in 4 weeks for repeat clinical and radiographic assessment of the right foot with 3 views of the right foot upon return to office.            [1] No past medical history on file.  [2]   Allergies  Allergen Reactions    Pollen Extracts Hives     Runny nose, itchy eyes   [3] No  past surgical history on file.

## 2025-06-16 ENCOUNTER — APPOINTMENT (OUTPATIENT)
Facility: CLINIC | Age: 40
End: 2025-06-16
Payer: COMMERCIAL

## 2025-06-20 ENCOUNTER — APPOINTMENT (OUTPATIENT)
Dept: PHARMACY | Facility: HOSPITAL | Age: 40
End: 2025-06-20
Payer: COMMERCIAL

## 2025-06-20 ENCOUNTER — OFFICE VISIT (OUTPATIENT)
Dept: ORTHOPEDIC SURGERY | Facility: CLINIC | Age: 40
End: 2025-06-20
Payer: COMMERCIAL

## 2025-06-20 ENCOUNTER — HOSPITAL ENCOUNTER (OUTPATIENT)
Dept: RADIOLOGY | Facility: CLINIC | Age: 40
Discharge: HOME | End: 2025-06-20
Payer: COMMERCIAL

## 2025-06-20 DIAGNOSIS — S92.351A CLOSED DISPLACED FRACTURE OF FIFTH METATARSAL BONE OF RIGHT FOOT, INITIAL ENCOUNTER: ICD-10-CM

## 2025-06-20 DIAGNOSIS — S92.351A CLOSED DISPLACED FRACTURE OF FIFTH METATARSAL BONE OF RIGHT FOOT, INITIAL ENCOUNTER: Primary | ICD-10-CM

## 2025-06-20 PROCEDURE — 73610 X-RAY EXAM OF ANKLE: CPT | Mod: RIGHT SIDE | Performed by: RADIOLOGY

## 2025-06-20 PROCEDURE — 99213 OFFICE O/P EST LOW 20 MIN: CPT | Mod: 25 | Performed by: ORTHOPAEDIC SURGERY

## 2025-06-20 PROCEDURE — 73630 X-RAY EXAM OF FOOT: CPT | Mod: RIGHT SIDE | Performed by: RADIOLOGY

## 2025-06-20 PROCEDURE — 73610 X-RAY EXAM OF ANKLE: CPT | Mod: RT

## 2025-06-20 PROCEDURE — 28470 CLTX METATARSAL FX WO MNP EA: CPT | Performed by: ORTHOPAEDIC SURGERY

## 2025-06-20 PROCEDURE — 73630 X-RAY EXAM OF FOOT: CPT | Mod: RT

## 2025-06-20 ASSESSMENT — PAIN - FUNCTIONAL ASSESSMENT: PAIN_FUNCTIONAL_ASSESSMENT: NO/DENIES PAIN

## 2025-06-20 NOTE — PROGRESS NOTES
"Zach Cuellar    CHIEF COMPLAINT:  ***      HISTORY OF PRESENT ILLNESS:  This is a 39 y.o. male who presents today with    Right foot pain - inversion injury stepping off a step - 2 weeks ago.   Place in [facility]     Pain staying the same.     Occupation: IT   Nicotine (Smoking/Vaping) History: non-smoker  Personal or Family Hx of DVT/PE: No  Metal Allergy: No  Diabetic:   No  Last Hgba1c:   Lab Results   Component Value Date    HGBA1C 5.9 (H) 2025       Assessment/Plan:  ***          Follow up {follow up:85058}, with *** films upon return.    Thank you for the opportunity to participate in this patient's care.    Physical Exam:  Well appearing male in no acute distress; Alert and oriented.  {RIGHT LEFT BILAT:75922} Lower Extremity:   Grossly intact ROM and strength, no obvious deformity.  {RIGHT LEFT BILAT:74402}  Lower Extremity:  Gait Cycle:  {GAIT ORTHO:95046}  Inspection:  Swelling: {yes,no:84556} Redness: {yes,no:56646}  Ecchymosis: {yes,no:01297} Effusion: {yes,no:11885}    Alignment: {POD FOOT DEFORMITIES:49604}  Pain on palpation:  {anatomy; location tenderness ankle:892021::\"None\"}  ROM: {rom:258103}  Strength: {PE ANKLE/FOOT FRACTURE STRENGTH ORTHOSUR::\"normal\"}  Stability:  {POD ANKLE STABILITY:59231}  Specialized Tests:   Granados Test: {:160990}  Syndesmotic Squeeze Test:  {POSITIVE/NEGATIVE:94959}  Calcaneal Squeeze Test:  {POSITIVE/NEGATIVE:19596}  Metatarsal Squeeze / Compression Test:  {POSITIVE/NEGATIVE:69742}  Hop/Hockey Tape Test:  {POSITIVE/NEGATIVE:90698}  Neurologic Status:  Sensation to all 4 compartments of lower extremity are grossly intact to light touch today in the office.***  Vascular Status:   Tibialis posterior pulse: {:718440}  Dorsalis pedis pulse: {:424930}  Skin:  Normal      IMAGING:     Imaging was ordered today***. Final results and radiologist's interpretation, available in the [facility] health record. Images were reviewed with the patient/family members in the " office today. My personal interpretation of the performed imaging is ***    ***Previous imaging performed on ***, and available in the Epic health record, showed ***.    Nicol Arnold MD

## 2025-06-25 ENCOUNTER — TELEMEDICINE (OUTPATIENT)
Dept: PHARMACY | Facility: HOSPITAL | Age: 40
End: 2025-06-25
Payer: COMMERCIAL

## 2025-06-25 DIAGNOSIS — E66.9 OBESITY (BMI 30-39.9): Primary | ICD-10-CM

## 2025-06-25 NOTE — PROGRESS NOTES
Clinical Pharmacy Appointment    Patient ID: Zach Cuellar is a 39 y.o. male who presents for Weight Management.    Pt is here for Follow Up appointment.     Referring Provider: No ref. provider found  PCP: Benoit Whalen MD   Last visit with PCP: 2/28/25   Next visit with PCP: Not scheduled     Subjective     HPI    Patient is a 39 y.o. male presenting to a follow up clinical pharmacy visit for weight management. Baseline BMI 33.75 kg/m2. Baseline weight 270 lbs. Current weight 238 lbs. Broke foot since last visit and has been unable to weight himself. Endorsed clothes fitting looser and increased energy. Overall weight loss goal would be back pain relief. However, would like to get down to 200-220 lbs.     At the last visit he was increased to Zepbound 10 mg once weekly. Denied any side effects with the medication. Has taken 3 doses. No longer feeling like the medication is wearing off after a certain amount of days. Reported it has signicianlty helped with his daytime snacking during work.    Diet:   - Works from home   - Decreased snacking since last visit   - Decreased carb intake   - Decreased cravings   B: Coffee   L: Pre-made salads from [employer]   D: eats out italian food pizza pasta  Sn: chips, pretzels, apples, banana   Dr: No sugar drinks     Exercise - Broke foot now in walking boot -- exercise limited   - tennis and pickleball (was 3-4x per week but now 2-3)   - Walks dog - couple miles lap around [place]   - back pain limiting   - Car racing - cardio    No hx of pancreatitis, MTC, MEN2    Medication System Management  Patient's preferred pharmacy: [id number]  Affordability/Accessibility: Mounjaro/Ozempic/Trulicity require T2DM. Wegovy/Zepbound plan/benefit exclusion. Zepbound coupon for $650/month.      Objective   Allergies   Allergen Reactions    Pollen Extracts Hives     Runny nose, itchy eyes     Social History     Social History Narrative    Not on file      Medication  "Review  Current Outpatient Medications   Medication Instructions    tirzepatide (weight loss) (ZEPBOUND) 10 mg, subcutaneous, Every 7 days      Vitals  BP Readings from Last 2 Encounters:   06/08/25 107/77   02/28/25 126/74     BMI Readings from Last 1 Encounters:   06/08/25 28.50 kg/m²      Labs  A1C  Lab Results   Component Value Date    HGBA1C 5.9 (H) 02/28/2025     BMP  Lab Results   Component Value Date    CALCIUM 10.1 02/28/2025     02/28/2025    K 4.4 02/28/2025    CO2 28 02/28/2025     02/28/2025    BUN 13 02/28/2025    CREATININE 1.11 02/28/2025    EGFR 87 02/28/2025     LFTs  Lab Results   Component Value Date    ALT 45 02/28/2025    AST 13 02/28/2025    ALKPHOS 58 02/28/2025    BILITOT 0.8 02/28/2025     FLP  Lab Results   Component Value Date    TRIG 266 (H) 02/28/2025    CHOL 249 (H) 02/28/2025    LDLCALC 171 (H) 02/28/2025    HDL 34 (L) 02/28/2025     Urine Microalbumin  No results found for: \"MICROALBCREA\"  Weight Management  Wt Readings from Last 3 Encounters:   06/08/25 103 kg (228 lb)   02/28/25 122 kg (270 lb)   10/17/24 109 kg (240 lb)      There is no height or weight on file to calculate BMI.     Zepbound Education:   Counseled patient on Zepbound MOA, expectations, side effects, duration of therapy, administration, and monitoring parameters.  Provided detailed dosing and administration counseling to ensure proper technique.   Reviewed Zepbound titration schedule, starting with 2.5 mg once weekly to a goal of 15 mg once weekly if tolerated  Counseled patient on the benefits of GLP-1ra glycemic control and weight loss  Reviewed storage requirements of Zepbound when not in use, and when to administer the medication if a dose is missed.  Advised patient that they may experience improved satiety after meals and portion sizes of meals may be reduced as doses of Zepound increase.    Assessment/Plan   Problem List Items Addressed This Visit    None  Visit Diagnoses         Obesity (BMI " 30-39.9)    -  Primary    Relevant Orders    Referral to Clinical Pharmacy        Patient doing well with Zepbound. Denied side effects. Endorsed feeling the effects. Unable to check on weight due to being in a boot. Will continue current dose and re-assess in 1 month.  CONTINUE Zepbound 10 mg once weekly injection  CONTINUE making healthy diet and lifestyle adjustments  Provided patient with [facility] phone number for any additional questions or concerns [phone]    Follow up with Clinical Pharmacy Team 7/18/25 at 10 am     Time spent with pt: Total length of time 15 (minutes) of the encounter and more than 50% was spent counseling the patient.    Continue all meds under the continuation of care with the referring provider and clinical pharmacy team.    Please reach out to the Clinical Pharmacy Team if there are any further questions.     Verbal consent to manage patient's drug therapy was obtained from patient. They were informed they may decline to participate or withdraw from participation in pharmacy services at any time.    Mirian Lomax, PharmD  Clinical Pharmacy Specialist   [phone]

## 2025-06-27 ENCOUNTER — APPOINTMENT (OUTPATIENT)
Dept: PHARMACY | Facility: HOSPITAL | Age: 40
End: 2025-06-27
Payer: COMMERCIAL

## 2025-06-30 DIAGNOSIS — E66.9 OBESITY (BMI 30-39.9): ICD-10-CM

## 2025-07-01 DIAGNOSIS — E66.9 OBESITY (BMI 30-39.9): ICD-10-CM

## 2025-07-02 ENCOUNTER — APPOINTMENT (OUTPATIENT)
Dept: ORTHOPEDIC SURGERY | Facility: CLINIC | Age: 40
End: 2025-07-02
Payer: COMMERCIAL

## 2025-07-18 ENCOUNTER — APPOINTMENT (OUTPATIENT)
Dept: PHARMACY | Facility: HOSPITAL | Age: 40
End: 2025-07-18
Payer: COMMERCIAL

## 2025-07-18 DIAGNOSIS — E66.9 OBESITY (BMI 30-39.9): Primary | ICD-10-CM

## 2025-07-18 NOTE — PROGRESS NOTES
Clinical Pharmacy Appointment    Patient ID: Zach Culelar is a 39 y.o. male who presents for Weight Management.    Pt is here for Follow Up appointment.     Referring Provider: Benoit Whalen MD  PCP: Benoit Whalen MD   Last visit with PCP: 2/28/25   Next visit with PCP: Not scheduled     Subjective     HPI    Patient is a 39 y.o. male presenting to a follow up clinical pharmacy visit for weight management. Baseline BMI 33.75 kg/m2. Baseline weight 270 lbs. Current weight 237lbs. Broke foot since last visit and has been unable to weight himself. Endorsed clothes fitting looser and increased energy. Overall weight loss goal would be back pain relief. However, would like to get down to 200-220 lbs.     At the last visit he was continued Zepbound 10 mg once weekly. Denied any side effects with the medication. Started feeling like the medication is wearing off after a certain amount of days. Reported it has signicianlty helped with his daytime snacking during work.    Diet:   - Works from home   - Decreased snacking since last visit   - Decreased carb intake   - Decreased cravings   B: Coffee   L: Pre-made salads from [employer]   D: eats out italian food pizza pasta  Sn: chips, pretzels, apples, banana   Dr: No sugar drinks     Exercise - Broke foot now in walking boot -- exercise limited   - tennis and pickleball (was 3-4x per week but now 2-3)   - Walks dog - couple miles lap around [place]   - back pain limiting   - Car racing - cardio    No hx of pancreatitis, MTC, MEN2    Medication System Management  Patient's preferred pharmacy: [id number]  Affordability/Accessibility: Mounjaro/Ozempic/Trulicity require T2DM. Wegovy/Zepbound plan/benefit exclusion. Zepbound coupon for $650/month.      Objective   Allergies   Allergen Reactions    Pollen Extracts Hives     Runny nose, itchy eyes     Social History     Social History Narrative    Not on file      Medication Review  Current Outpatient Medications  "  Medication Instructions    tirzepatide (weight loss) (ZEPBOUND) 12.5 mg, subcutaneous, Every 7 days      Vitals  BP Readings from Last 2 Encounters:   06/08/25 107/77   02/28/25 126/74     BMI Readings from Last 1 Encounters:   06/08/25 28.50 kg/m²      Labs  A1C  Lab Results   Component Value Date    HGBA1C 5.9 (H) 02/28/2025     BMP  Lab Results   Component Value Date    CALCIUM 10.1 02/28/2025     02/28/2025    K 4.4 02/28/2025    CO2 28 02/28/2025     02/28/2025    BUN 13 02/28/2025    CREATININE 1.11 02/28/2025    EGFR 87 02/28/2025     LFTs  Lab Results   Component Value Date    ALT 45 02/28/2025    AST 13 02/28/2025    ALKPHOS 58 02/28/2025    BILITOT 0.8 02/28/2025     FLP  Lab Results   Component Value Date    TRIG 266 (H) 02/28/2025    CHOL 249 (H) 02/28/2025    LDLCALC 171 (H) 02/28/2025    HDL 34 (L) 02/28/2025     Urine Microalbumin  No results found for: \"MICROALBCREA\"  Weight Management  Wt Readings from Last 3 Encounters:   06/08/25 103 kg (228 lb)   02/28/25 122 kg (270 lb)   10/17/24 109 kg (240 lb)      There is no height or weight on file to calculate BMI.     Zepbound Education:   Counseled patient on Zepbound MOA, expectations, side effects, duration of therapy, administration, and monitoring parameters.  Provided detailed dosing and administration counseling to ensure proper technique.   Reviewed Zepbound titration schedule, starting with 2.5 mg once weekly to a goal of 15 mg once weekly if tolerated  Counseled patient on the benefits of GLP-1ra glycemic control and weight loss  Reviewed storage requirements of Zepbound when not in use, and when to administer the medication if a dose is missed.  Advised patient that they may experience improved satiety after meals and portion sizes of meals may be reduced as doses of Zepound increase.    Assessment/Plan   Problem List Items Addressed This Visit    None  Visit Diagnoses         Obesity (BMI 30-39.9)    -  Primary    Relevant " Medications    tirzepatide, weight loss, (Zepbound) 12.5 mg/0.5 mL injection    Other Relevant Orders    Referral to Clinical Pharmacy        Patient doing well with Zepbound. Denied side effects. Starting to feel the effects wear off later int the week. Will continue to increase the dose today for continued weight loss benefit.  INCREASE Zepbound 12.5 mg once weekly injection  Prescription sent to [id number] [place], [place]  CONTINUE making healthy diet and lifestyle adjustments  Provided patient with [facility] phone number for any additional questions or concerns [phone]    Follow up with Clinical Pharmacy Team 8/15/25 at 10 am     Time spent with pt: Total length of time 15 (minutes) of the encounter and more than 50% was spent counseling the patient.    Continue all meds under the continuation of care with the referring provider and clinical pharmacy team.    Please reach out to the Clinical Pharmacy Team if there are any further questions.     Verbal consent to manage patient's drug therapy was obtained from patient. They were informed they may decline to participate or withdraw from participation in pharmacy services at any time.    Mirian Lomax, PharmD  Clinical Pharmacy Specialist   [phone]